# Patient Record
Sex: MALE | Race: WHITE
[De-identification: names, ages, dates, MRNs, and addresses within clinical notes are randomized per-mention and may not be internally consistent; named-entity substitution may affect disease eponyms.]

---

## 2023-05-31 ENCOUNTER — HOSPITAL ENCOUNTER (INPATIENT)
Dept: HOSPITAL 95 - ER | Age: 73
LOS: 8 days | Discharge: HOME HEALTH | DRG: 280 | End: 2023-06-08
Attending: HOSPITALIST | Admitting: HOSPITALIST
Payer: OTHER GOVERNMENT

## 2023-05-31 VITALS — SYSTOLIC BLOOD PRESSURE: 103 MMHG | DIASTOLIC BLOOD PRESSURE: 92 MMHG

## 2023-05-31 VITALS — HEIGHT: 68 IN | BODY MASS INDEX: 25.79 KG/M2 | WEIGHT: 170.2 LBS

## 2023-05-31 DIAGNOSIS — Z79.4: ICD-10-CM

## 2023-05-31 DIAGNOSIS — I47.29: ICD-10-CM

## 2023-05-31 DIAGNOSIS — N17.9: ICD-10-CM

## 2023-05-31 DIAGNOSIS — I08.1: ICD-10-CM

## 2023-05-31 DIAGNOSIS — E83.42: ICD-10-CM

## 2023-05-31 DIAGNOSIS — E88.09: ICD-10-CM

## 2023-05-31 DIAGNOSIS — Z99.81: ICD-10-CM

## 2023-05-31 DIAGNOSIS — N18.2: ICD-10-CM

## 2023-05-31 DIAGNOSIS — J90: ICD-10-CM

## 2023-05-31 DIAGNOSIS — Z79.82: ICD-10-CM

## 2023-05-31 DIAGNOSIS — K82.8: ICD-10-CM

## 2023-05-31 DIAGNOSIS — Z66: ICD-10-CM

## 2023-05-31 DIAGNOSIS — E11.22: ICD-10-CM

## 2023-05-31 DIAGNOSIS — K21.9: ICD-10-CM

## 2023-05-31 DIAGNOSIS — E78.5: ICD-10-CM

## 2023-05-31 DIAGNOSIS — Z20.822: ICD-10-CM

## 2023-05-31 DIAGNOSIS — D64.9: ICD-10-CM

## 2023-05-31 DIAGNOSIS — R57.0: ICD-10-CM

## 2023-05-31 DIAGNOSIS — R62.7: ICD-10-CM

## 2023-05-31 DIAGNOSIS — I25.2: ICD-10-CM

## 2023-05-31 DIAGNOSIS — Z95.5: ICD-10-CM

## 2023-05-31 DIAGNOSIS — Z79.01: ICD-10-CM

## 2023-05-31 DIAGNOSIS — R79.1: ICD-10-CM

## 2023-05-31 DIAGNOSIS — I11.0: Primary | ICD-10-CM

## 2023-05-31 DIAGNOSIS — F25.9: ICD-10-CM

## 2023-05-31 DIAGNOSIS — I21.A1: ICD-10-CM

## 2023-05-31 DIAGNOSIS — N50.89: ICD-10-CM

## 2023-05-31 DIAGNOSIS — I25.5: ICD-10-CM

## 2023-05-31 DIAGNOSIS — I25.10: ICD-10-CM

## 2023-05-31 DIAGNOSIS — G20: ICD-10-CM

## 2023-05-31 DIAGNOSIS — Z79.899: ICD-10-CM

## 2023-05-31 DIAGNOSIS — J96.01: ICD-10-CM

## 2023-05-31 DIAGNOSIS — E87.20: ICD-10-CM

## 2023-05-31 DIAGNOSIS — I50.82: ICD-10-CM

## 2023-05-31 DIAGNOSIS — D63.1: ICD-10-CM

## 2023-05-31 DIAGNOSIS — K80.20: ICD-10-CM

## 2023-05-31 DIAGNOSIS — I27.20: ICD-10-CM

## 2023-05-31 DIAGNOSIS — I50.21: ICD-10-CM

## 2023-05-31 DIAGNOSIS — E11.65: ICD-10-CM

## 2023-05-31 LAB
ALBUMIN SERPL BCP-MCNC: 2.7 G/DL (ref 3.4–5)
ALBUMIN/GLOB SERPL: 0.8 {RATIO} (ref 0.8–1.8)
ALT SERPL W P-5'-P-CCNC: 41 U/L (ref 12–78)
ANION GAP SERPL CALCULATED.4IONS-SCNC: 7 MMOL/L (ref 6–16)
AST SERPL W P-5'-P-CCNC: 36 U/L (ref 12–37)
BASOPHILS # BLD AUTO: 0.04 K/MM3 (ref 0–0.23)
BASOPHILS NFR BLD AUTO: 0 % (ref 0–2)
BILIRUB SERPL-MCNC: 0.3 MG/DL (ref 0.1–1)
BUN SERPL-MCNC: 50 MG/DL (ref 8–24)
CALCIUM SERPL-MCNC: 8.2 MG/DL (ref 8.5–10.1)
CHLORIDE SERPL-SCNC: 113 MMOL/L (ref 98–108)
CO2 SERPL-SCNC: 21 MMOL/L (ref 21–32)
CREAT SERPL-MCNC: 1.55 MG/DL (ref 0.6–1.2)
DEPRECATED RDW RBC AUTO: 53.3 FL (ref 35.1–46.3)
EOSINOPHIL # BLD AUTO: 0.15 K/MM3 (ref 0–0.68)
EOSINOPHIL NFR BLD AUTO: 2 % (ref 0–6)
ERYTHROCYTE [DISTWIDTH] IN BLOOD BY AUTOMATED COUNT: 16.5 % (ref 11.7–14.2)
FLUAV RNA SPEC QL NAA+PROBE: NEGATIVE
FLUBV RNA SPEC QL NAA+PROBE: NEGATIVE
GLOBULIN SER CALC-MCNC: 3.5 G/DL (ref 2.2–4)
GLUCOSE SERPL-MCNC: 97 MG/DL (ref 70–99)
HCT VFR BLD AUTO: 36.3 % (ref 37–53)
HGB BLD-MCNC: 11.5 G/DL (ref 13.5–17.5)
IMM GRANULOCYTES # BLD AUTO: 0.03 K/MM3 (ref 0–0.1)
IMM GRANULOCYTES NFR BLD AUTO: 0 % (ref 0–1)
LYMPHOCYTES # BLD AUTO: 0.56 K/MM3 (ref 0.84–5.2)
LYMPHOCYTES NFR BLD AUTO: 6 % (ref 21–46)
MCHC RBC AUTO-ENTMCNC: 31.7 G/DL (ref 31.5–36.5)
MCV RBC AUTO: 89 FL (ref 80–100)
MONOCYTES # BLD AUTO: 0.49 K/MM3 (ref 0.16–1.47)
MONOCYTES NFR BLD AUTO: 5 % (ref 4–13)
NEUTROPHILS # BLD AUTO: 7.92 K/MM3 (ref 1.96–9.15)
NEUTROPHILS NFR BLD AUTO: 86 % (ref 41–73)
NRBC # BLD AUTO: 0 K/MM3 (ref 0–0.02)
NRBC BLD AUTO-RTO: 0 /100 WBC (ref 0–0.2)
PLATELET # BLD AUTO: 227 K/MM3 (ref 150–400)
POTASSIUM SERPL-SCNC: 4.6 MMOL/L (ref 3.5–5.5)
PROT SERPL-MCNC: 6.2 G/DL (ref 6.4–8.2)
RSV RNA SPEC QL NAA+PROBE: NEGATIVE
SARS-COV-2 RNA RESP QL NAA+PROBE: NEGATIVE
SODIUM SERPL-SCNC: 141 MMOL/L (ref 136–145)

## 2023-05-31 PROCEDURE — A9270 NON-COVERED ITEM OR SERVICE: HCPCS

## 2023-05-31 PROCEDURE — C1769 GUIDE WIRE: HCPCS

## 2023-05-31 PROCEDURE — C1887 CATHETER, GUIDING: HCPCS

## 2023-05-31 PROCEDURE — C8929 TTE W OR WO FOL WCON,DOPPLER: HCPCS

## 2023-05-31 PROCEDURE — C1894 INTRO/SHEATH, NON-LASER: HCPCS

## 2023-05-31 NOTE — NUR
ARRIVAL:
 
I recieved report from Ellie JOHNSON RN. Patient arrived to PCU 01 via
gurney and was slid to the bed. He is alert and oriented. Oxygen via NC at 4l,
saturations are difficult to obtain. His breating is even and unlabored. VSS.
Patiet repositioned to his left side. Call light in reach.

## 2023-06-01 VITALS — SYSTOLIC BLOOD PRESSURE: 120 MMHG | DIASTOLIC BLOOD PRESSURE: 96 MMHG

## 2023-06-01 VITALS — SYSTOLIC BLOOD PRESSURE: 118 MMHG | DIASTOLIC BLOOD PRESSURE: 86 MMHG

## 2023-06-01 VITALS — DIASTOLIC BLOOD PRESSURE: 87 MMHG | SYSTOLIC BLOOD PRESSURE: 109 MMHG

## 2023-06-01 VITALS — DIASTOLIC BLOOD PRESSURE: 90 MMHG | SYSTOLIC BLOOD PRESSURE: 119 MMHG

## 2023-06-01 VITALS — DIASTOLIC BLOOD PRESSURE: 87 MMHG | SYSTOLIC BLOOD PRESSURE: 113 MMHG

## 2023-06-01 VITALS — SYSTOLIC BLOOD PRESSURE: 118 MMHG | DIASTOLIC BLOOD PRESSURE: 96 MMHG

## 2023-06-01 VITALS — SYSTOLIC BLOOD PRESSURE: 108 MMHG | DIASTOLIC BLOOD PRESSURE: 85 MMHG

## 2023-06-01 LAB
ANION GAP SERPL CALCULATED.4IONS-SCNC: 8 MMOL/L (ref 6–16)
BASOPHILS # BLD AUTO: 0.03 K/MM3 (ref 0–0.23)
BASOPHILS NFR BLD AUTO: 0 % (ref 0–2)
BUN SERPL-MCNC: 50 MG/DL (ref 8–24)
CALCIUM SERPL-MCNC: 8.2 MG/DL (ref 8.5–10.1)
CHLORIDE SERPL-SCNC: 111 MMOL/L (ref 98–108)
CO2 SERPL-SCNC: 20 MMOL/L (ref 21–32)
CREAT SERPL-MCNC: 1.59 MG/DL (ref 0.6–1.2)
DEPRECATED RDW RBC AUTO: 50.6 FL (ref 35.1–46.3)
EOSINOPHIL # BLD AUTO: 0.13 K/MM3 (ref 0–0.68)
EOSINOPHIL NFR BLD AUTO: 1 % (ref 0–6)
ERYTHROCYTE [DISTWIDTH] IN BLOOD BY AUTOMATED COUNT: 16.4 % (ref 11.7–14.2)
GLUCOSE SERPL-MCNC: 218 MG/DL (ref 70–99)
HCT VFR BLD AUTO: 35.3 % (ref 37–53)
HGB BLD-MCNC: 11.2 G/DL (ref 13.5–17.5)
IMM GRANULOCYTES # BLD AUTO: 0.02 K/MM3 (ref 0–0.1)
IMM GRANULOCYTES NFR BLD AUTO: 0 % (ref 0–1)
LYMPHOCYTES # BLD AUTO: 0.77 K/MM3 (ref 0.84–5.2)
LYMPHOCYTES NFR BLD AUTO: 8 % (ref 21–46)
MCHC RBC AUTO-ENTMCNC: 31.7 G/DL (ref 31.5–36.5)
MCV RBC AUTO: 86 FL (ref 80–100)
MONOCYTES # BLD AUTO: 0.46 K/MM3 (ref 0.16–1.47)
MONOCYTES NFR BLD AUTO: 5 % (ref 4–13)
NEUTROPHILS # BLD AUTO: 7.88 K/MM3 (ref 1.96–9.15)
NEUTROPHILS NFR BLD AUTO: 85 % (ref 41–73)
NRBC # BLD AUTO: 0 K/MM3 (ref 0–0.02)
NRBC BLD AUTO-RTO: 0 /100 WBC (ref 0–0.2)
PLATELET # BLD AUTO: 257 K/MM3 (ref 150–400)
POTASSIUM SERPL-SCNC: 4.6 MMOL/L (ref 3.5–5.5)
SODIUM SERPL-SCNC: 139 MMOL/L (ref 136–145)

## 2023-06-01 NOTE — NUR
SHIFT SUMMARY
ASSUMED CARE OF PT AT 1900. PT IS A/OX4 BUT ODD. FOR EXAMPLE, PT REPEATS
HIMSELF, SUCH AS STATING FAVORITE FOOD IS LIVER AND ONIONS AND PT WILL BRING
UP IRRELIVENT SUBJECTS SUCH AS HIS BIRTHDAY BEING IN 4 MONTHS. HEART SOUNDS
TACHY. PT RATE WAS BETWEEN  WHILE SLEEPING -130 WHILE AWAKE.
HOSPITALIST NOTIFIED AND ORDERED HOME MEDIATIONS. PT AS PITTING EDEMA IN LEGS.
 
AT 0330 PT C/O CHEST PAIN LIKE WHEN HE HAD A HEART ATTACK. EKG DONE,
HOSPITALIST ORDERED LABS. THIS NURSE WENT TO REASSESS AND ASKED OF PAIN WAS
BETTER AND PT SAID "YES, AFTER THE TUMS".
 
PT USED A CONDOM CATH DURING THE NOC. PT USED BEDPAN. HAS SOME RED AREA IN
GROIN AND SCRAPES ON FEET.

## 2023-06-01 NOTE — NUR
SHIFT SUMMARY/REPORT GIVEN
PT ALERT AND ORIENTED X 4. FORGETFUL AT TIMES. REPEATS QUESTIONS. HR STABLE.
BP STABLE. NO CP OR PRESSURE REPORTED T/O SHIFT. DR. THOMAS CONSULTED. PLAN TO
CONT DIURESING PT AND POSSIBLE ANGIO LATER ON. OXYGEN SATURATION MAINTAINED
ABOVE 92% ON 2 L VIA NC. PT 2 PERSON ASSIST UP TO COMMODE. DOES NOT USE CALL
LIGHT, INSTRUCTED HOW TO USE MULTIPLE TIMES. BED ALARM IN PLACE. CALL LIGHT
WITHIN REACH. REPORT GIVEN TO CLAUDINE ARROYO RN.

## 2023-06-02 VITALS — DIASTOLIC BLOOD PRESSURE: 77 MMHG | SYSTOLIC BLOOD PRESSURE: 108 MMHG

## 2023-06-02 VITALS — DIASTOLIC BLOOD PRESSURE: 92 MMHG | SYSTOLIC BLOOD PRESSURE: 109 MMHG

## 2023-06-02 VITALS — SYSTOLIC BLOOD PRESSURE: 122 MMHG | DIASTOLIC BLOOD PRESSURE: 98 MMHG

## 2023-06-02 VITALS — SYSTOLIC BLOOD PRESSURE: 111 MMHG | DIASTOLIC BLOOD PRESSURE: 98 MMHG

## 2023-06-02 VITALS — DIASTOLIC BLOOD PRESSURE: 87 MMHG | SYSTOLIC BLOOD PRESSURE: 117 MMHG

## 2023-06-02 VITALS — SYSTOLIC BLOOD PRESSURE: 102 MMHG | DIASTOLIC BLOOD PRESSURE: 86 MMHG

## 2023-06-02 LAB
ALBUMIN SERPL BCP-MCNC: 2.7 G/DL (ref 3.4–5)
ANION GAP SERPL CALCULATED.4IONS-SCNC: 9 MMOL/L (ref 6–16)
BASOPHILS # BLD AUTO: 0.05 K/MM3 (ref 0–0.23)
BASOPHILS NFR BLD AUTO: 1 % (ref 0–2)
BUN SERPL-MCNC: 56 MG/DL (ref 8–24)
CALCIUM SERPL-MCNC: 8.6 MG/DL (ref 8.5–10.1)
CHLORIDE SERPL-SCNC: 109 MMOL/L (ref 98–108)
CO2 SERPL-SCNC: 21 MMOL/L (ref 21–32)
CREAT SERPL-MCNC: 1.68 MG/DL (ref 0.6–1.2)
DEPRECATED RDW RBC AUTO: 50.4 FL (ref 35.1–46.3)
EOSINOPHIL # BLD AUTO: 0.15 K/MM3 (ref 0–0.68)
EOSINOPHIL NFR BLD AUTO: 2 % (ref 0–6)
ERYTHROCYTE [DISTWIDTH] IN BLOOD BY AUTOMATED COUNT: 16.4 % (ref 11.7–14.2)
GLUCOSE SERPL-MCNC: 187 MG/DL (ref 70–99)
HCT VFR BLD AUTO: 34.6 % (ref 37–53)
HGB BLD-MCNC: 11.2 G/DL (ref 13.5–17.5)
IMM GRANULOCYTES # BLD AUTO: 0.02 K/MM3 (ref 0–0.1)
IMM GRANULOCYTES NFR BLD AUTO: 0 % (ref 0–1)
LYMPHOCYTES # BLD AUTO: 0.93 K/MM3 (ref 0.84–5.2)
LYMPHOCYTES NFR BLD AUTO: 11 % (ref 21–46)
MCHC RBC AUTO-ENTMCNC: 32.4 G/DL (ref 31.5–36.5)
MCV RBC AUTO: 85 FL (ref 80–100)
MONOCYTES # BLD AUTO: 0.54 K/MM3 (ref 0.16–1.47)
MONOCYTES NFR BLD AUTO: 7 % (ref 4–13)
NEUTROPHILS # BLD AUTO: 6.66 K/MM3 (ref 1.96–9.15)
NEUTROPHILS NFR BLD AUTO: 80 % (ref 41–73)
NRBC # BLD AUTO: 0 K/MM3 (ref 0–0.02)
NRBC BLD AUTO-RTO: 0 /100 WBC (ref 0–0.2)
PHOSPHATE SERPL-MCNC: 3.8 MG/DL (ref 2.5–4.9)
PLATELET # BLD AUTO: 240 K/MM3 (ref 150–400)
POTASSIUM SERPL-SCNC: 4.4 MMOL/L (ref 3.5–5.5)
SODIUM SERPL-SCNC: 139 MMOL/L (ref 136–145)

## 2023-06-02 NOTE — NUR
SHIFT SUMMARY
PT ALERT AND ORIENTED X 4. FORGETFUL AT TIMES. PT REPEATS SELF FREQUENTY.
CARDIOLOGIST AND MD NOTIFIED OF PT'S INCREASE IN BNP. PT 2 PERSON ASSIST TO
CHAIR FOR MEALS AND COMMODE. PT DOES NOT USE CALL LIGHT. FREQUENT CHECKS TO
SEE IF PT IS IN NEED OF ASSISTANCE. BP STABLE. HR STABLE. SR-'S. MD
AWARE OF 11 BEAT RUN OF VTACH. MAG WNL. OXYGEN SATURATION MAINTAINED ABOVE 92%
ON 2 L VIA NC. CAREGIVER AT BEDSIDE THIS EVENING. PT Q 2 TURN. COCCYX RED.
MEPLEX IN PLACE. NO REPORTS OF CP OR PRESSURE. BED IN LOWEST POSITION. BED
ALARM IN PLACE. WILL CONT TO MONITOR UNTIL REPORT GIVEN TO NIGHTSHIFT RN.

## 2023-06-02 NOTE — NUR
SHIFT SUMMARY
ASSUMED CARE OF PT AT 1900. PT IS A/OX4. HEART SOUNDS TACHY. LUNG SOUNDS
DIMINISHED AT BASES. PT REMAINED ON 2L NC, SATURATIONS ABOVE 95%. PT ONLY
SLEPT ABOUT 10 MIN AT A TIME. PT WAS A 2P ASSIST TO BSC. PT HAD SMALL BM. PT
USED CONDOM CATH DURING THE NOC. PT MADE NPO AT MIDNIGHT FOR POSSIBLE ANGIO
PER CARDIOLOGY NOTE. PT STARTED ON FLUID RESTRICTION ALSO. PT HAD NO NEW
COMPLAINTS.

## 2023-06-03 VITALS — DIASTOLIC BLOOD PRESSURE: 93 MMHG | SYSTOLIC BLOOD PRESSURE: 117 MMHG

## 2023-06-03 VITALS — DIASTOLIC BLOOD PRESSURE: 76 MMHG | SYSTOLIC BLOOD PRESSURE: 96 MMHG

## 2023-06-03 VITALS — DIASTOLIC BLOOD PRESSURE: 94 MMHG | SYSTOLIC BLOOD PRESSURE: 123 MMHG

## 2023-06-03 VITALS — DIASTOLIC BLOOD PRESSURE: 93 MMHG | SYSTOLIC BLOOD PRESSURE: 115 MMHG

## 2023-06-03 VITALS — SYSTOLIC BLOOD PRESSURE: 105 MMHG | DIASTOLIC BLOOD PRESSURE: 83 MMHG

## 2023-06-03 VITALS — SYSTOLIC BLOOD PRESSURE: 107 MMHG | DIASTOLIC BLOOD PRESSURE: 84 MMHG

## 2023-06-03 LAB
ALBUMIN SERPL BCP-MCNC: 2.7 G/DL (ref 3.4–5)
ANION GAP SERPL CALCULATED.4IONS-SCNC: 7 MMOL/L (ref 6–16)
BASOPHILS # BLD AUTO: 0.04 K/MM3 (ref 0–0.23)
BASOPHILS NFR BLD AUTO: 0 % (ref 0–2)
BUN SERPL-MCNC: 50 MG/DL (ref 8–24)
CALCIUM SERPL-MCNC: 8.5 MG/DL (ref 8.5–10.1)
CHLORIDE SERPL-SCNC: 106 MMOL/L (ref 98–108)
CO2 SERPL-SCNC: 26 MMOL/L (ref 21–32)
CREAT SERPL-MCNC: 1.44 MG/DL (ref 0.6–1.2)
DEPRECATED RDW RBC AUTO: 48.7 FL (ref 35.1–46.3)
EOSINOPHIL # BLD AUTO: 0.12 K/MM3 (ref 0–0.68)
EOSINOPHIL NFR BLD AUTO: 1 % (ref 0–6)
ERYTHROCYTE [DISTWIDTH] IN BLOOD BY AUTOMATED COUNT: 16.3 % (ref 11.7–14.2)
GLUCOSE SERPL-MCNC: 191 MG/DL (ref 70–99)
HCT VFR BLD AUTO: 33.7 % (ref 37–53)
HGB BLD-MCNC: 11.2 G/DL (ref 13.5–17.5)
IMM GRANULOCYTES # BLD AUTO: 0.03 K/MM3 (ref 0–0.1)
IMM GRANULOCYTES NFR BLD AUTO: 0 % (ref 0–1)
LYMPHOCYTES # BLD AUTO: 0.57 K/MM3 (ref 0.84–5.2)
LYMPHOCYTES NFR BLD AUTO: 6 % (ref 21–46)
MCHC RBC AUTO-ENTMCNC: 33.2 G/DL (ref 31.5–36.5)
MCV RBC AUTO: 83 FL (ref 80–100)
MONOCYTES # BLD AUTO: 0.47 K/MM3 (ref 0.16–1.47)
MONOCYTES NFR BLD AUTO: 5 % (ref 4–13)
NEUTROPHILS # BLD AUTO: 9.2 K/MM3 (ref 1.96–9.15)
NEUTROPHILS NFR BLD AUTO: 88 % (ref 41–73)
NRBC # BLD AUTO: 0 K/MM3 (ref 0–0.02)
NRBC BLD AUTO-RTO: 0 /100 WBC (ref 0–0.2)
PHOSPHATE SERPL-MCNC: 3.4 MG/DL (ref 2.5–4.9)
PLATELET # BLD AUTO: 244 K/MM3 (ref 150–400)
POTASSIUM SERPL-SCNC: 4.2 MMOL/L (ref 3.5–5.5)
SODIUM SERPL-SCNC: 139 MMOL/L (ref 136–145)

## 2023-06-03 NOTE — NUR
SHIFT SUMMARY
ASSUMED CARE OF PT AT 1900. PT IS A/OX4. PT HR RANGED FROM . LUNG SOUNDS
DIMINISHED. PT WAS RA FOR PART OF THE SHIFT BUT DURING THE NOC NEEDED 2L NC TO
MAINTAIN SATS. PT HAD CONDOM CATH FOR STRICT I/O. PT REMAINED IN BED BUT DID
NOT SLEEP.

## 2023-06-03 NOTE — NUR
AM NOTE;
 
PT ALERT AND ORIENTED X3, FLAT AFFECT HX SCHIZO, ABLE TO COMMUNICATE NEEDS
COOPERATIVE WITH CARES. 1PA FOR TRANSFERS. PT WAS ASSISTED TO CHAIR THIS
MORNING FOR BREAKFAST WITH NO ISSUES RECEIVED A BEDBATH AS WELL. DENIES CHEST
PAIN/PRESSURE. VITALS HRR 'S, 'S, SATS KEPT ABOVE 92% ON RA,
AFEBRILE. CONDOM CATH WAS IN PLACE AT SHIFT CHANGE FOR ACCURATE I&O'S PT HAD A
HARD TIME KEEPING CONDO CATH IN PLACE, ATTENDS WITH PEE PAD ON. PT ABLE TO USE
BSC FOR TOILETING. REMAINS ON 2L FR PT COMPLIANT. NO OTHER ISSUES ENCOUNTERED
THIS MORNING PT NOW BACK IN BED, CALL LIGHTS IN REACH WILL CONTINUE TO MONITOR

## 2023-06-04 VITALS — DIASTOLIC BLOOD PRESSURE: 100 MMHG | SYSTOLIC BLOOD PRESSURE: 120 MMHG

## 2023-06-04 VITALS — SYSTOLIC BLOOD PRESSURE: 120 MMHG | DIASTOLIC BLOOD PRESSURE: 90 MMHG

## 2023-06-04 VITALS — DIASTOLIC BLOOD PRESSURE: 74 MMHG | SYSTOLIC BLOOD PRESSURE: 93 MMHG

## 2023-06-04 VITALS — SYSTOLIC BLOOD PRESSURE: 97 MMHG | DIASTOLIC BLOOD PRESSURE: 72 MMHG

## 2023-06-04 VITALS — DIASTOLIC BLOOD PRESSURE: 87 MMHG | SYSTOLIC BLOOD PRESSURE: 106 MMHG

## 2023-06-04 VITALS — DIASTOLIC BLOOD PRESSURE: 89 MMHG | SYSTOLIC BLOOD PRESSURE: 110 MMHG

## 2023-06-04 VITALS — SYSTOLIC BLOOD PRESSURE: 111 MMHG | DIASTOLIC BLOOD PRESSURE: 97 MMHG

## 2023-06-04 VITALS — SYSTOLIC BLOOD PRESSURE: 112 MMHG | DIASTOLIC BLOOD PRESSURE: 75 MMHG

## 2023-06-04 VITALS — SYSTOLIC BLOOD PRESSURE: 108 MMHG | DIASTOLIC BLOOD PRESSURE: 84 MMHG

## 2023-06-04 LAB
ALBUMIN SERPL BCP-MCNC: 2.7 G/DL (ref 3.4–5)
ANION GAP SERPL CALCULATED.4IONS-SCNC: 9 MMOL/L (ref 6–16)
BASOPHILS # BLD AUTO: 0.04 K/MM3 (ref 0–0.23)
BASOPHILS NFR BLD AUTO: 0 % (ref 0–2)
BUN SERPL-MCNC: 42 MG/DL (ref 8–24)
CALCIUM SERPL-MCNC: 8.7 MG/DL (ref 8.5–10.1)
CHLORIDE SERPL-SCNC: 101 MMOL/L (ref 98–108)
CO2 SERPL-SCNC: 28 MMOL/L (ref 21–32)
CREAT SERPL-MCNC: 1.34 MG/DL (ref 0.6–1.2)
DEPRECATED RDW RBC AUTO: 49.4 FL (ref 35.1–46.3)
EOSINOPHIL # BLD AUTO: 0.18 K/MM3 (ref 0–0.68)
EOSINOPHIL NFR BLD AUTO: 2 % (ref 0–6)
ERYTHROCYTE [DISTWIDTH] IN BLOOD BY AUTOMATED COUNT: 16.4 % (ref 11.7–14.2)
GLUCOSE SERPL-MCNC: 97 MG/DL (ref 70–99)
HCT VFR BLD AUTO: 34.1 % (ref 37–53)
HGB BLD-MCNC: 11.2 G/DL (ref 13.5–17.5)
IMM GRANULOCYTES # BLD AUTO: 0.03 K/MM3 (ref 0–0.1)
IMM GRANULOCYTES NFR BLD AUTO: 0 % (ref 0–1)
LYMPHOCYTES # BLD AUTO: 0.73 K/MM3 (ref 0.84–5.2)
LYMPHOCYTES NFR BLD AUTO: 8 % (ref 21–46)
MCHC RBC AUTO-ENTMCNC: 32.8 G/DL (ref 31.5–36.5)
MCV RBC AUTO: 84 FL (ref 80–100)
MONOCYTES # BLD AUTO: 0.4 K/MM3 (ref 0.16–1.47)
MONOCYTES NFR BLD AUTO: 4 % (ref 4–13)
NEUTROPHILS # BLD AUTO: 7.77 K/MM3 (ref 1.96–9.15)
NEUTROPHILS NFR BLD AUTO: 85 % (ref 41–73)
NRBC # BLD AUTO: 0 K/MM3 (ref 0–0.02)
NRBC BLD AUTO-RTO: 0 /100 WBC (ref 0–0.2)
PHOSPHATE SERPL-MCNC: 3.1 MG/DL (ref 2.5–4.9)
PLATELET # BLD AUTO: 240 K/MM3 (ref 150–400)
POTASSIUM SERPL-SCNC: 3.7 MMOL/L (ref 3.5–5.5)
SODIUM SERPL-SCNC: 138 MMOL/L (ref 136–145)

## 2023-06-04 NOTE — NUR
SHIFT SUMMARY:
 
PT HAD NO ACUTE EVENTS THROUGHOUT THE SHIFT. PT VITALS REMAINED STABLE WITH
SBP IN 'S. HR IN THE 90'S-110'S. PT DENIES ANY CHEST PAIN OR PRESSURE.
PT ABLE TO AMBULATE TO THE COMMODE WITH A 1 PERSON ASSIST.PT HAD A BED BATH
THIS AFTERNOON. PT CONTINUES TO REMAIN ALERT AND ORIENTED X4. PT IMPULSIVE AT
TIMES, BED ALARM TURNED ON WITH FREQUENT REMINDERS TO PUSH THE CALL LIGHT FOR
ASSISTANCE. PT CONTINUES TO REMAIN ON A 1000ML FLUID RESTRICTION, PT COMPLIANT
WITH THIS ORDER. PT HAS A CONDOM CATH IN PLACE WITH 1500 MLS URINE OUTPUT.
WHICH IS DRAINING TO GRAVITY, YELLOW URINE. PT CAREGIVER AT THE BEDSIDE THIS
AFTERNOON AND UPDATED TO PLAN OF CARE. POSSIBLE PLANS FOR AN ANGIOGRAM
TOMORROW IF PT REMAINS STABLE T/O THE NIGHT. PT NPO AT MIDNIGHT FOR POSSIBLE
PROCEDURE. WILL CONTINUE TO MONITOR AND GIVE REPORT TO ONCOMING NOC SHIFT RN.

## 2023-06-04 NOTE — NUR
SHIFT SUMMARY
ASSUMED CARE OF PT AT 1900. PT IS A/OX4 BUT FORGETFUL. PT ATTEMPTED TO GET OUT
OF BED ONCE THIS SHIFT. HEART SOUNDS TACHY. LUNG SOUNDS DIMINSHED AT BASES. PT
WA RA T/O THE NOC. CONDOM CATH. PT HAD NO NEW COMPLAINTS BUT DID NOT SLEEP AT
ALL TONIGHT.

## 2023-06-05 VITALS — SYSTOLIC BLOOD PRESSURE: 104 MMHG | DIASTOLIC BLOOD PRESSURE: 82 MMHG

## 2023-06-05 VITALS — SYSTOLIC BLOOD PRESSURE: 95 MMHG | DIASTOLIC BLOOD PRESSURE: 80 MMHG

## 2023-06-05 VITALS — DIASTOLIC BLOOD PRESSURE: 82 MMHG | SYSTOLIC BLOOD PRESSURE: 103 MMHG

## 2023-06-05 VITALS — SYSTOLIC BLOOD PRESSURE: 107 MMHG | DIASTOLIC BLOOD PRESSURE: 80 MMHG

## 2023-06-05 VITALS — SYSTOLIC BLOOD PRESSURE: 90 MMHG | DIASTOLIC BLOOD PRESSURE: 71 MMHG

## 2023-06-05 VITALS — DIASTOLIC BLOOD PRESSURE: 80 MMHG | SYSTOLIC BLOOD PRESSURE: 108 MMHG

## 2023-06-05 VITALS — SYSTOLIC BLOOD PRESSURE: 94 MMHG | DIASTOLIC BLOOD PRESSURE: 79 MMHG

## 2023-06-05 VITALS — SYSTOLIC BLOOD PRESSURE: 106 MMHG | DIASTOLIC BLOOD PRESSURE: 83 MMHG

## 2023-06-05 VITALS — SYSTOLIC BLOOD PRESSURE: 114 MMHG | DIASTOLIC BLOOD PRESSURE: 88 MMHG

## 2023-06-05 VITALS — DIASTOLIC BLOOD PRESSURE: 76 MMHG | SYSTOLIC BLOOD PRESSURE: 93 MMHG

## 2023-06-05 VITALS — SYSTOLIC BLOOD PRESSURE: 96 MMHG | DIASTOLIC BLOOD PRESSURE: 77 MMHG

## 2023-06-05 VITALS — DIASTOLIC BLOOD PRESSURE: 82 MMHG | SYSTOLIC BLOOD PRESSURE: 95 MMHG

## 2023-06-05 VITALS — SYSTOLIC BLOOD PRESSURE: 103 MMHG | DIASTOLIC BLOOD PRESSURE: 86 MMHG

## 2023-06-05 VITALS — DIASTOLIC BLOOD PRESSURE: 85 MMHG | SYSTOLIC BLOOD PRESSURE: 115 MMHG

## 2023-06-05 LAB
ANION GAP SERPL CALCULATED.4IONS-SCNC: 6 MMOL/L (ref 6–16)
BASOPHILS # BLD AUTO: 0.03 K/MM3 (ref 0–0.23)
BASOPHILS NFR BLD AUTO: 0 % (ref 0–2)
BUN SERPL-MCNC: 37 MG/DL (ref 8–24)
CALCIUM SERPL-MCNC: 8.4 MG/DL (ref 8.5–10.1)
CHLORIDE SERPL-SCNC: 97 MMOL/L (ref 98–108)
CO2 SERPL-SCNC: 32 MMOL/L (ref 21–32)
CREAT SERPL-MCNC: 1.29 MG/DL (ref 0.6–1.2)
DEPRECATED RDW RBC AUTO: 49.4 FL (ref 35.1–46.3)
EOSINOPHIL # BLD AUTO: 0.24 K/MM3 (ref 0–0.68)
EOSINOPHIL NFR BLD AUTO: 4 % (ref 0–6)
ERYTHROCYTE [DISTWIDTH] IN BLOOD BY AUTOMATED COUNT: 16.7 % (ref 11.7–14.2)
GLUCOSE SERPL-MCNC: 145 MG/DL (ref 70–99)
HCT VFR BLD AUTO: 33.4 % (ref 37–53)
HGB BLD-MCNC: 11.2 G/DL (ref 13.5–17.5)
IMM GRANULOCYTES # BLD AUTO: 0.02 K/MM3 (ref 0–0.1)
IMM GRANULOCYTES NFR BLD AUTO: 0 % (ref 0–1)
LYMPHOCYTES # BLD AUTO: 0.79 K/MM3 (ref 0.84–5.2)
LYMPHOCYTES NFR BLD AUTO: 11 % (ref 21–46)
MAGNESIUM SERPL-MCNC: 1.5 MG/DL (ref 1.6–2.4)
MCHC RBC AUTO-ENTMCNC: 33.5 G/DL (ref 31.5–36.5)
MCV RBC AUTO: 83 FL (ref 80–100)
MONOCYTES # BLD AUTO: 0.36 K/MM3 (ref 0.16–1.47)
MONOCYTES NFR BLD AUTO: 5 % (ref 4–13)
NEUTROPHILS # BLD AUTO: 5.47 K/MM3 (ref 1.96–9.15)
NEUTROPHILS NFR BLD AUTO: 79 % (ref 41–73)
NRBC # BLD AUTO: 0 K/MM3 (ref 0–0.02)
NRBC BLD AUTO-RTO: 0 /100 WBC (ref 0–0.2)
PLATELET # BLD AUTO: 228 K/MM3 (ref 150–400)
POTASSIUM SERPL-SCNC: 3.9 MMOL/L (ref 3.5–5.5)
SODIUM SERPL-SCNC: 135 MMOL/L (ref 136–145)

## 2023-06-05 PROCEDURE — B2161ZZ FLUOROSCOPY OF RIGHT AND LEFT HEART USING LOW OSMOLAR CONTRAST: ICD-10-PCS | Performed by: INTERNAL MEDICINE

## 2023-06-05 PROCEDURE — B246ZZ3 ULTRASONOGRAPHY OF RIGHT AND LEFT HEART, INTRAVASCULAR: ICD-10-PCS | Performed by: INTERNAL MEDICINE

## 2023-06-05 PROCEDURE — 4A023N8 MEASUREMENT OF CARDIAC SAMPLING AND PRESSURE, BILATERAL, PERCUTANEOUS APPROACH: ICD-10-PCS | Performed by: INTERNAL MEDICINE

## 2023-06-05 NOTE — NUR
SHIFT SUMMARY
 
PT IS A/Ox4 AND COOPERATIVE WITH CARE PROVIDED BY MEMBERS OF STAFF. ANSWERS
QUESTIONS APPROPRIATELY AND ABLE TO MAKE HIS NEEDS KNOWN. HX OF
SCHIZOAFFECTIVE DISORDER WITH PT BECOMING IMPULSIVE AT TIMES. CARDIAC SOOD,
REMAINS IN SR-JUNCTIONAL TACH WITH HR RANGING IN THE 70-80'S AND THEN
INTERMITTENTLY JUMPING 'S. PT ALSO HAS SMALL 16 BEAT RUN OF VTACH. PT
WAS ASYMPTOMATIC OF INCIDENT WITH NO REPORTS OF PALPITAIONS OR DIZZINESS.
NO REPORTS OF CP OR PRESSURE T/O THE NIGHT.  SBP HAS BEEN SOFT WITH SP IN THE
90'S, BUT HAS BEEN STABLE. RESPIRATORY WISE, MAINTAINS SPO2 >90% ON RA.
INTERMITTENT EPISODES OF SOB WHEN LAYING FLAT OR WITH ABULATION. PT DOES
RECOVER QUICKY HOWEVER. PER DAYSHIFT REPORT, PT DOSES OF LASIX HAVE BEEN
INCREASED. GI/, PT IS INCONTINENT OF URINE, CONDOM CATH IN PLACE, PATENT AND
DRAINING CLEAR/YELLOW URINE TO GRAVITY. ABLE TO ABULATE TO BSC WITH TWO PERSON
ASSIST.  PT HAS BEEN NPO SINCE MDN FOR SCHEDULED ANGIO THIS AM. NO NEW ORDERS
AT THIS TIME, WILL REPORT TO ONCOMING RN. JHOANA DU AS OF THIS NOTE

## 2023-06-05 NOTE — NUR
SHIFT SUMMARY:
 
PT REMAINED ALERT AND ORIENTED X4 THROUGHOUT THE SHIFT. ABLE TO ANSWER
QUESTIONS AND MAKE NEEDS KNOWN. PT COOPERATIVE AND PLEASANT WITH STAFF. PT
IMPULSIVE AT TIMES AND REMINDED TO USE THE CALL LIGHT FOR ASSISTANCE. PT BED
ALARM ON FOR SAFETY. PT VITALS REMAINED STABLE WITH 'S HR 70'S-120'S.
PT DENIED ANY CHEST PAIN OR PRESSURE T/O THE SHIFT. PT WENT FOR ANGIOGRAM
TODAY AND RETURNED WITH A TR BAND IN THE RIGHT WRIST, AS WELL AS AN ACCESS
SITE IN THE RIGHT UPPER ARM. TR BAND HAD 10 MLS OF AIR WHICH WAS DEFLATED AND
REMOVED WITH NO BLEEDING, OOZING OR HEMATOMA PRESENT. PT HAS OBSITE IN PLACE
AND ARMBOARD AS A REMINDER TO NOT USE WRIST. PT UPPER SITE HAS A DRESSING
WHICH HAS NO BLEEDING, OOZING OR HEMATOMA PRESENT. PER DR. THOMAS PT NEEDS A
SURGICAL CONSULT FOR FURTHER INTERVENTION. CAREGIVER WAS AT THE BEDSIDE THIS
EVENING AND WAS UPDATED TO PLAN OF CARE. PT TRANSITIONED TO PO LASIX WITH THE
FIRST DOSE GIVEN THIS EVENING.PT HAS A CONDOM CATH IN PLACE, WHICH HAS HAD A
TOTAL OF 2750 ML T/O THE SHIFT OF PALE YELLOW URINE, WHICH IS DRAINING TO
GRAVITY. WILL CONTINUE TO MONITOR AND GIVE REPORT TO THE ONCOMING [facility] SHIFT
RN.

## 2023-06-05 NOTE — NUR
AM NOTE:
PT ALERT AND ORIENTED X4. ABLE TO ANSWER QUESTIONS AND MAKE NEEDS KNOWN. PT
 WITH 'S. PT DENIES ANY CHEST PAIN/PRESSURE OR SOB. PT HAS
BILATERAL EDEMA IN LOWER EXTREMETIES, SCROTUM AND MID ABDOMEN. PT DENIES ANY
PAIN T/O BODY. DR. THOMAS AT BEDSIDE THIS MORNING WITH PLANS FOR AN ANGIOGRAM
THIS AFTERNOON. HELD THE LOVENOX PER DR. THOMAS THIS MORNING FOR PROCEDURE. PT
MADE NPO AT MIDNIGHT. WILL CONTINUE TO MONITOR T/O THE SHIFT.

## 2023-06-06 VITALS — DIASTOLIC BLOOD PRESSURE: 78 MMHG | SYSTOLIC BLOOD PRESSURE: 95 MMHG

## 2023-06-06 VITALS — DIASTOLIC BLOOD PRESSURE: 74 MMHG | SYSTOLIC BLOOD PRESSURE: 94 MMHG

## 2023-06-06 VITALS — SYSTOLIC BLOOD PRESSURE: 79 MMHG | DIASTOLIC BLOOD PRESSURE: 62 MMHG

## 2023-06-06 VITALS — SYSTOLIC BLOOD PRESSURE: 97 MMHG | DIASTOLIC BLOOD PRESSURE: 81 MMHG

## 2023-06-06 VITALS — SYSTOLIC BLOOD PRESSURE: 87 MMHG | DIASTOLIC BLOOD PRESSURE: 68 MMHG

## 2023-06-06 VITALS — DIASTOLIC BLOOD PRESSURE: 70 MMHG | SYSTOLIC BLOOD PRESSURE: 91 MMHG

## 2023-06-06 VITALS — DIASTOLIC BLOOD PRESSURE: 72 MMHG | SYSTOLIC BLOOD PRESSURE: 97 MMHG

## 2023-06-06 VITALS — DIASTOLIC BLOOD PRESSURE: 73 MMHG | SYSTOLIC BLOOD PRESSURE: 98 MMHG

## 2023-06-06 VITALS — SYSTOLIC BLOOD PRESSURE: 94 MMHG | DIASTOLIC BLOOD PRESSURE: 68 MMHG

## 2023-06-06 VITALS — DIASTOLIC BLOOD PRESSURE: 74 MMHG | SYSTOLIC BLOOD PRESSURE: 95 MMHG

## 2023-06-06 LAB
ANION GAP SERPL CALCULATED.4IONS-SCNC: 8 MMOL/L (ref 6–16)
BASOPHILS # BLD AUTO: 0.03 K/MM3 (ref 0–0.23)
BASOPHILS NFR BLD AUTO: 1 % (ref 0–2)
BUN SERPL-MCNC: 33 MG/DL (ref 8–24)
CALCIUM SERPL-MCNC: 8.7 MG/DL (ref 8.5–10.1)
CHLORIDE SERPL-SCNC: 91 MMOL/L (ref 98–108)
CO2 SERPL-SCNC: 36 MMOL/L (ref 21–32)
CREAT SERPL-MCNC: 1.33 MG/DL (ref 0.6–1.2)
DEPRECATED RDW RBC AUTO: 49.1 FL (ref 35.1–46.3)
EOSINOPHIL # BLD AUTO: 0.26 K/MM3 (ref 0–0.68)
EOSINOPHIL NFR BLD AUTO: 4 % (ref 0–6)
ERYTHROCYTE [DISTWIDTH] IN BLOOD BY AUTOMATED COUNT: 16.8 % (ref 11.7–14.2)
GLUCOSE SERPL-MCNC: 110 MG/DL (ref 70–99)
HCT VFR BLD AUTO: 33.5 % (ref 37–53)
HGB BLD-MCNC: 11.3 G/DL (ref 13.5–17.5)
IMM GRANULOCYTES # BLD AUTO: 0.02 K/MM3 (ref 0–0.1)
IMM GRANULOCYTES NFR BLD AUTO: 0 % (ref 0–1)
LYMPHOCYTES # BLD AUTO: 0.59 K/MM3 (ref 0.84–5.2)
LYMPHOCYTES NFR BLD AUTO: 9 % (ref 21–46)
MAGNESIUM SERPL-MCNC: 1.9 MG/DL (ref 1.6–2.4)
MCHC RBC AUTO-ENTMCNC: 33.7 G/DL (ref 31.5–36.5)
MCV RBC AUTO: 82 FL (ref 80–100)
MONOCYTES # BLD AUTO: 0.38 K/MM3 (ref 0.16–1.47)
MONOCYTES NFR BLD AUTO: 6 % (ref 4–13)
NEUTROPHILS # BLD AUTO: 4.97 K/MM3 (ref 1.96–9.15)
NEUTROPHILS NFR BLD AUTO: 80 % (ref 41–73)
NRBC # BLD AUTO: 0 K/MM3 (ref 0–0.02)
NRBC BLD AUTO-RTO: 0 /100 WBC (ref 0–0.2)
PLATELET # BLD AUTO: 254 K/MM3 (ref 150–400)
POTASSIUM SERPL-SCNC: 3.4 MMOL/L (ref 3.5–5.5)
SODIUM SERPL-SCNC: 135 MMOL/L (ref 136–145)

## 2023-06-06 NOTE — NUR
SHIFT SUMMARY
 
PT A&OX4, FLAT AFFECT. COOPERATIVE. S02>90% ON RA. TELEMETRY SHOWS SINUS TACH,
MOSTLY 100'S-110'S. BP SOFT. R RADIAL AND BASILIC SITES SOFT, NO BRUISING, NO
BLEEDING. ARM BOARD IN PLACE. PT INCONTINENT VOID X2 THIS SHIFT, FULL BED
CHANGE. NO BM THIS SHIFT. DENIES PAIN. UP TO CHAIR SBA W/ FWW TO SIT THIS
EVENING AND EAT DINNER. REPOSITIONED Q2H. CALL LIGHT IN REACH. BED ALARM ON.

## 2023-06-06 NOTE — NUR
SHIFT SUMMARY
 
PT A&Ox4, CALLS AND COMMUNICATES NEEDS APPROPRIATELY, PT WITH FLAT AFFECT. BP
SOFT WITH SBP 90's, MAP> 65, ASYMPTOMATIC. JUNCTIONAL SR-ST 's, DENIES
CP/PRESSURE. SpO2> 92% RA, DENIES SOB. PT DID NOT TRY TO GET OOB WITHOUT
STAFF. CONDOM CATH CHANGED AT START OF SHIFT, REMAINED IN PLACE, DRAINING TO
GRAVITY. NO BM THIS SHIFT. R RADIAL SITE REMAINS UNCHANGED, WNL, ARM BOARD IN
PLACE.  R BASILIC SITE REMAINS UNCHANGED, WNL, DRESSING C/D/I. BOTH SITES
WITH NO S/S OF BRUISING, BLEEDING, OR HEMATOMA. NO OTHER EVENTS, WILL REPORT
TO ONCOMING RN.

## 2023-06-07 VITALS — SYSTOLIC BLOOD PRESSURE: 90 MMHG | DIASTOLIC BLOOD PRESSURE: 71 MMHG

## 2023-06-07 VITALS — DIASTOLIC BLOOD PRESSURE: 98 MMHG | SYSTOLIC BLOOD PRESSURE: 119 MMHG

## 2023-06-07 VITALS — DIASTOLIC BLOOD PRESSURE: 76 MMHG | SYSTOLIC BLOOD PRESSURE: 104 MMHG

## 2023-06-07 VITALS — DIASTOLIC BLOOD PRESSURE: 70 MMHG | SYSTOLIC BLOOD PRESSURE: 93 MMHG

## 2023-06-07 LAB
ALBUMIN SERPL BCP-MCNC: 2.8 G/DL (ref 3.4–5)
ALBUMIN/GLOB SERPL: 0.7 {RATIO} (ref 0.8–1.8)
ALT SERPL W P-5'-P-CCNC: 55 U/L (ref 12–78)
ANION GAP SERPL CALCULATED.4IONS-SCNC: 6 MMOL/L (ref 6–16)
AST SERPL W P-5'-P-CCNC: 31 U/L (ref 12–37)
BILIRUB SERPL-MCNC: 0.5 MG/DL (ref 0.1–1)
BUN SERPL-MCNC: 36 MG/DL (ref 8–24)
CALCIUM SERPL-MCNC: 8.5 MG/DL (ref 8.5–10.1)
CHLORIDE SERPL-SCNC: 89 MMOL/L (ref 98–108)
CO2 SERPL-SCNC: 39 MMOL/L (ref 21–32)
CREAT SERPL-MCNC: 1.47 MG/DL (ref 0.6–1.2)
DEPRECATED RDW RBC AUTO: 50.6 FL (ref 35.1–46.3)
ERYTHROCYTE [DISTWIDTH] IN BLOOD BY AUTOMATED COUNT: 17 % (ref 11.7–14.2)
GLOBULIN SER CALC-MCNC: 3.8 G/DL (ref 2.2–4)
GLUCOSE SERPL-MCNC: 115 MG/DL (ref 70–99)
HCT VFR BLD AUTO: 33.9 % (ref 37–53)
HGB BLD-MCNC: 11.2 G/DL (ref 13.5–17.5)
MCHC RBC AUTO-ENTMCNC: 33 G/DL (ref 31.5–36.5)
MCV RBC AUTO: 84 FL (ref 80–100)
NRBC # BLD AUTO: 0 K/MM3 (ref 0–0.02)
NRBC BLD AUTO-RTO: 0 /100 WBC (ref 0–0.2)
PLATELET # BLD AUTO: 241 K/MM3 (ref 150–400)
POTASSIUM SERPL-SCNC: 3.5 MMOL/L (ref 3.5–5.5)
PROT SERPL-MCNC: 6.6 G/DL (ref 6.4–8.2)
SODIUM SERPL-SCNC: 134 MMOL/L (ref 136–145)

## 2023-06-07 NOTE — NUR
SHIFT SUMMARY
 
PT REMAINS A/Ox4 AND COOPERATIVE WITH CARE PROVIDED BY MEMBERS OF STAFF.
ANSWERS QUESTIONS APPROPRIATELY AND ABLE TO MAKE HIS NEEDS KNOWN. NO ACUTE
EVENTS OVERNIGHT. HX OF SCHIZOAFFECTIVE DISORDER, SO PT CAN BE IMPULSIVE AT
TIMES. EASILY REDIRECTABLE. CARDIAC SOOD, CONTINUES TO BOUNCE AROUND
ST-JUNCTIONAL RHYTHM WITH HR RANGING 'S. NO REPORTS OF CP OR PRESSURE AS
OF THIS NOTE. SBP REMAINS SOFT (80-90'S), BUT MAP >70. RESPIRATORY WISE,
MAINTAINS SPO2 >90% RA WITH NO REPORTS OF SOB OR DYSPNEA T/O THE NIGHT. ABLE
TO ABULATE TO BSC WITH 1 ASSIST W/FWW. CONTINUES TO BE INCONTINENT OF URINE,
ATTENDS IN PLACE AND CHANGED PRN. Q2 TURNS PERFORMED AS ORDERED. RIGHT RADIAL
ACCESS SITE FROM ANGIO 6/5 WNL, PROTECTIVE ARM BOARD STILL IN PLACE. BED ALARM
ON AND CHAIR ALARM ON WHEN UP IN CHAIR. NO NEW ORDERS AT THIS TIME, WILL
REPORT TO ONCOMING RN. JHOANA DU AS OF THIS NOTE

## 2023-06-07 NOTE — NUR
TRANSFER: REPORT RECEIVED FROM PCU RNRADHA. PT TO UNIT AT ABOUT 1640. PT IS
A/O, VSS. PT ORIENTED TO ROOM AND GIVEN CALL LIGHT. BED ALARM ON FOR SAFETY.
GARCIA IS WNL AND DRAINING. CATH LAB SITES AT R WRIST AND R AC ARE WNL, NO
EDEMA OR REDNESS. TELE STABLE, AND VERIFIED WITH REBIScan. PT DENIED
CP OR SOB. WILL CTM AND REPORT TO NOC RN

## 2023-06-07 NOTE — NUR
RN/DAY SHIFT SUMMARY
MORNING SHIFT REPORT RECIEVED DURING THE MORNING GREETING WITH THE PATIENT.
THE PAITENT WAS THEN ASSESSED AND MEDICATIONS WERE GIVEN WITH NO ISSUES. THE
PATIENT HAS A NOTED FLAT AFFECT BUT IS PLEASANT OTHERWISE. CONTINUEING TO
MONITOR.

## 2023-06-08 VITALS — SYSTOLIC BLOOD PRESSURE: 118 MMHG | DIASTOLIC BLOOD PRESSURE: 82 MMHG

## 2023-06-08 VITALS — DIASTOLIC BLOOD PRESSURE: 80 MMHG | SYSTOLIC BLOOD PRESSURE: 99 MMHG

## 2023-06-08 VITALS — SYSTOLIC BLOOD PRESSURE: 112 MMHG | DIASTOLIC BLOOD PRESSURE: 82 MMHG

## 2023-06-08 LAB
ALBUMIN SERPL BCP-MCNC: 2.6 G/DL (ref 3.4–5)
ALBUMIN/GLOB SERPL: 0.8 {RATIO} (ref 0.8–1.8)
ALT SERPL W P-5'-P-CCNC: 50 U/L (ref 12–78)
ANION GAP SERPL CALCULATED.4IONS-SCNC: 6 MMOL/L (ref 6–16)
AST SERPL W P-5'-P-CCNC: 29 U/L (ref 12–37)
BILIRUB SERPL-MCNC: 0.8 MG/DL (ref 0.1–1)
BUN SERPL-MCNC: 31 MG/DL (ref 8–24)
CALCIUM SERPL-MCNC: 8.7 MG/DL (ref 8.5–10.1)
CHLORIDE SERPL-SCNC: 95 MMOL/L (ref 98–108)
CO2 SERPL-SCNC: 38 MMOL/L (ref 21–32)
CREAT SERPL-MCNC: 1.3 MG/DL (ref 0.6–1.2)
DEPRECATED RDW RBC AUTO: 51.6 FL (ref 35.1–46.3)
ERYTHROCYTE [DISTWIDTH] IN BLOOD BY AUTOMATED COUNT: 17.3 % (ref 11.7–14.2)
GLOBULIN SER CALC-MCNC: 3.4 G/DL (ref 2.2–4)
GLUCOSE SERPL-MCNC: 105 MG/DL (ref 70–99)
HCT VFR BLD AUTO: 32.9 % (ref 37–53)
HGB BLD-MCNC: 10.8 G/DL (ref 13.5–17.5)
MAGNESIUM SERPL-MCNC: 2 MG/DL (ref 1.6–2.4)
MCHC RBC AUTO-ENTMCNC: 32.8 G/DL (ref 31.5–36.5)
MCV RBC AUTO: 85 FL (ref 80–100)
NRBC # BLD AUTO: 0 K/MM3 (ref 0–0.02)
NRBC BLD AUTO-RTO: 0 /100 WBC (ref 0–0.2)
PLATELET # BLD AUTO: 241 K/MM3 (ref 150–400)
POTASSIUM SERPL-SCNC: 3.5 MMOL/L (ref 3.5–5.5)
PROT SERPL-MCNC: 6 G/DL (ref 6.4–8.2)
SODIUM SERPL-SCNC: 139 MMOL/L (ref 136–145)

## 2023-06-08 NOTE — NUR
DISCHARGE
PT DISCHARGED HOME FROM UNIT AT APROX 1645. PT CARETAKER GIVEN WRITTEN AND
VERBAL INSTRUCTIONS AND VERBALIZED UNDERSTANDING. IV REMOVED. TELE REMOVED.
CARETAKER ASSISTED WITH DRESSING. WC TO CAR. NEW RX[facility].

## 2023-06-08 NOTE — NUR
EOS NOTE
 
PATIENT A/OX4, FLAT AFFECT, CAN BE IMPULSIVE. BED ALARM IS ON, CALL LIGHT
WITHIN REACH. EDUCATED PATIENT ON CALL LIGHT USAGE MULTIPLE TIMES, PATIENT
WOULD SET ALARM OFF RATHER THAN CALL FOR NEEDS. PATIENT DID NOT GRASP CALL
LIGHT USAGE. PATIENT REMAINED ON BBB 'S WITH PVC'S. FLUID RESTRICTION
REMAINS IN PLACE, PATIET FOLLOWS IT APPROPRIATLY. ACCIDENTLY PULLED CONDOM
CATH A FEW TIMES, PATIENT WAS PUT IN A BRIEF. MOSTLY INCONTINENT, SET ALARM
OFF ONCE TO ATTEMPT TO USE A URINAL BUT PATIENT HAD ALREADY SOILED HIMSELF.
ABRASIONS/SKIN TEARS THROUGHOUT IN SEEMINGLY DIFFERENT STAGES OF HEALING.
PATIENTS SKIN APPEARS THIN AND FRAGILE. VSS, CALL LIGHT WITHIN REACH, BED
ALARM ON. PATIENT GAIT IS SLOW, NOT INCREDIBLY WELL BALANCED, MOSTLY SHUFFLED.

## 2023-07-11 ENCOUNTER — HOSPITAL ENCOUNTER (INPATIENT)
Dept: HOSPITAL 95 - ER | Age: 73
LOS: 6 days | Discharge: HOSPICE | DRG: 280 | End: 2023-07-17
Attending: HOSPITALIST | Admitting: HOSPITALIST
Payer: OTHER GOVERNMENT

## 2023-07-11 VITALS — SYSTOLIC BLOOD PRESSURE: 102 MMHG | DIASTOLIC BLOOD PRESSURE: 79 MMHG

## 2023-07-11 VITALS — SYSTOLIC BLOOD PRESSURE: 95 MMHG | DIASTOLIC BLOOD PRESSURE: 81 MMHG

## 2023-07-11 VITALS — DIASTOLIC BLOOD PRESSURE: 74 MMHG | SYSTOLIC BLOOD PRESSURE: 93 MMHG

## 2023-07-11 VITALS — DIASTOLIC BLOOD PRESSURE: 55 MMHG | SYSTOLIC BLOOD PRESSURE: 77 MMHG

## 2023-07-11 VITALS — DIASTOLIC BLOOD PRESSURE: 79 MMHG | SYSTOLIC BLOOD PRESSURE: 96 MMHG

## 2023-07-11 VITALS — HEIGHT: 71 IN | BODY MASS INDEX: 22.96 KG/M2 | WEIGHT: 164.02 LBS

## 2023-07-11 VITALS — DIASTOLIC BLOOD PRESSURE: 80 MMHG | SYSTOLIC BLOOD PRESSURE: 105 MMHG

## 2023-07-11 VITALS — DIASTOLIC BLOOD PRESSURE: 79 MMHG | SYSTOLIC BLOOD PRESSURE: 102 MMHG

## 2023-07-11 VITALS — SYSTOLIC BLOOD PRESSURE: 118 MMHG | DIASTOLIC BLOOD PRESSURE: 73 MMHG

## 2023-07-11 VITALS — SYSTOLIC BLOOD PRESSURE: 78 MMHG | DIASTOLIC BLOOD PRESSURE: 43 MMHG

## 2023-07-11 VITALS — DIASTOLIC BLOOD PRESSURE: 73 MMHG | SYSTOLIC BLOOD PRESSURE: 91 MMHG

## 2023-07-11 VITALS — SYSTOLIC BLOOD PRESSURE: 91 MMHG | DIASTOLIC BLOOD PRESSURE: 67 MMHG

## 2023-07-11 VITALS — SYSTOLIC BLOOD PRESSURE: 106 MMHG | DIASTOLIC BLOOD PRESSURE: 84 MMHG

## 2023-07-11 VITALS — SYSTOLIC BLOOD PRESSURE: 81 MMHG | DIASTOLIC BLOOD PRESSURE: 66 MMHG

## 2023-07-11 VITALS — DIASTOLIC BLOOD PRESSURE: 71 MMHG | SYSTOLIC BLOOD PRESSURE: 95 MMHG

## 2023-07-11 VITALS — SYSTOLIC BLOOD PRESSURE: 116 MMHG | DIASTOLIC BLOOD PRESSURE: 85 MMHG

## 2023-07-11 VITALS — SYSTOLIC BLOOD PRESSURE: 95 MMHG | DIASTOLIC BLOOD PRESSURE: 65 MMHG

## 2023-07-11 VITALS — SYSTOLIC BLOOD PRESSURE: 92 MMHG | DIASTOLIC BLOOD PRESSURE: 65 MMHG

## 2023-07-11 VITALS — SYSTOLIC BLOOD PRESSURE: 109 MMHG | DIASTOLIC BLOOD PRESSURE: 69 MMHG

## 2023-07-11 VITALS — DIASTOLIC BLOOD PRESSURE: 60 MMHG | SYSTOLIC BLOOD PRESSURE: 96 MMHG

## 2023-07-11 DIAGNOSIS — Z79.82: ICD-10-CM

## 2023-07-11 DIAGNOSIS — E87.1: ICD-10-CM

## 2023-07-11 DIAGNOSIS — Z95.5: ICD-10-CM

## 2023-07-11 DIAGNOSIS — M54.9: ICD-10-CM

## 2023-07-11 DIAGNOSIS — I48.91: Primary | ICD-10-CM

## 2023-07-11 DIAGNOSIS — N17.9: ICD-10-CM

## 2023-07-11 DIAGNOSIS — E78.5: ICD-10-CM

## 2023-07-11 DIAGNOSIS — Z79.811: ICD-10-CM

## 2023-07-11 DIAGNOSIS — Z88.8: ICD-10-CM

## 2023-07-11 DIAGNOSIS — Z79.02: ICD-10-CM

## 2023-07-11 DIAGNOSIS — Z79.01: ICD-10-CM

## 2023-07-11 DIAGNOSIS — I27.20: ICD-10-CM

## 2023-07-11 DIAGNOSIS — G89.29: ICD-10-CM

## 2023-07-11 DIAGNOSIS — Z79.4: ICD-10-CM

## 2023-07-11 DIAGNOSIS — E87.5: ICD-10-CM

## 2023-07-11 DIAGNOSIS — D63.1: ICD-10-CM

## 2023-07-11 DIAGNOSIS — I25.10: ICD-10-CM

## 2023-07-11 DIAGNOSIS — Z79.899: ICD-10-CM

## 2023-07-11 DIAGNOSIS — Z51.5: ICD-10-CM

## 2023-07-11 DIAGNOSIS — G20: ICD-10-CM

## 2023-07-11 DIAGNOSIS — I50.23: ICD-10-CM

## 2023-07-11 DIAGNOSIS — I25.2: ICD-10-CM

## 2023-07-11 DIAGNOSIS — Z66: ICD-10-CM

## 2023-07-11 DIAGNOSIS — K21.9: ICD-10-CM

## 2023-07-11 DIAGNOSIS — I13.0: ICD-10-CM

## 2023-07-11 DIAGNOSIS — I95.9: ICD-10-CM

## 2023-07-11 DIAGNOSIS — I21.A1: ICD-10-CM

## 2023-07-11 DIAGNOSIS — F25.9: ICD-10-CM

## 2023-07-11 DIAGNOSIS — Z79.84: ICD-10-CM

## 2023-07-11 DIAGNOSIS — I25.5: ICD-10-CM

## 2023-07-11 DIAGNOSIS — N18.30: ICD-10-CM

## 2023-07-11 DIAGNOSIS — E11.22: ICD-10-CM

## 2023-07-11 LAB
ALBUMIN SERPL BCP-MCNC: 2.7 G/DL (ref 3.4–5)
ALBUMIN/GLOB SERPL: 0.8 {RATIO} (ref 0.8–1.8)
ALT SERPL W P-5'-P-CCNC: 36 U/L (ref 12–78)
ANION GAP SERPL CALCULATED.4IONS-SCNC: 13 MMOL/L (ref 6–16)
AST SERPL W P-5'-P-CCNC: 26 U/L (ref 12–37)
BASOPHILS # BLD AUTO: 0.02 K/MM3 (ref 0–0.23)
BASOPHILS NFR BLD AUTO: 0 % (ref 0–2)
BILIRUB SERPL-MCNC: 0.4 MG/DL (ref 0.1–1)
BUN SERPL-MCNC: 76 MG/DL (ref 8–24)
CALCIUM SERPL-MCNC: 7.8 MG/DL (ref 8.5–10.1)
CHLORIDE SERPL-SCNC: 109 MMOL/L (ref 98–108)
CO2 SERPL-SCNC: 20 MMOL/L (ref 21–32)
CREAT SERPL-MCNC: 2.02 MG/DL (ref 0.6–1.2)
DEPRECATED RDW RBC AUTO: 57.8 FL (ref 35.1–46.3)
EOSINOPHIL # BLD AUTO: 0.06 K/MM3 (ref 0–0.68)
EOSINOPHIL NFR BLD AUTO: 1 % (ref 0–6)
ERYTHROCYTE [DISTWIDTH] IN BLOOD BY AUTOMATED COUNT: 17.6 % (ref 11.7–14.2)
GLOBULIN SER CALC-MCNC: 3.4 G/DL (ref 2.2–4)
GLUCOSE SERPL-MCNC: 192 MG/DL (ref 70–99)
HCT VFR BLD AUTO: 32.7 % (ref 37–53)
HGB BLD-MCNC: 10.4 G/DL (ref 13.5–17.5)
IMM GRANULOCYTES # BLD AUTO: 0.03 K/MM3 (ref 0–0.1)
IMM GRANULOCYTES NFR BLD AUTO: 0 % (ref 0–1)
LYMPHOCYTES # BLD AUTO: 0.51 K/MM3 (ref 0.84–5.2)
LYMPHOCYTES NFR BLD AUTO: 7 % (ref 21–46)
MCHC RBC AUTO-ENTMCNC: 31.8 G/DL (ref 31.5–36.5)
MCV RBC AUTO: 90 FL (ref 80–100)
MONOCYTES # BLD AUTO: 0.37 K/MM3 (ref 0.16–1.47)
MONOCYTES NFR BLD AUTO: 5 % (ref 4–13)
NEUTROPHILS # BLD AUTO: 6.41 K/MM3 (ref 1.96–9.15)
NEUTROPHILS NFR BLD AUTO: 87 % (ref 41–73)
NRBC # BLD AUTO: 0 K/MM3 (ref 0–0.02)
NRBC BLD AUTO-RTO: 0 /100 WBC (ref 0–0.2)
PLATELET # BLD AUTO: 227 K/MM3 (ref 150–400)
POTASSIUM SERPL-SCNC: 4.2 MMOL/L (ref 3.5–5.5)
PROT SERPL-MCNC: 6.1 G/DL (ref 6.4–8.2)
SODIUM SERPL-SCNC: 142 MMOL/L (ref 136–145)

## 2023-07-11 PROCEDURE — A9270 NON-COVERED ITEM OR SERVICE: HCPCS

## 2023-07-11 NOTE — NUR
ASSUMED CARE OF PT AT 0715
BEDSIDE REPORT RECEIVED FROM NESHA SNOW. PT IS A/O X4, PLEASANT AND
COOPERATIVE. AFIB 120-130, AWAITING AMIODARONE DRIP FROM PHARMACY. BP
102/79 MAP 70,
PER DR. QUISPE, OK TO KEEP MAP >/= 65. ELEQUIS ORDERED. PLACED ON 2L NC
DUE TO SUSTAINED DESATURATION WITH O2 SAT 68-75% WITH EXERTION. OOB TO BSC
WITH MINIMAL ASSIST. BM X1, VOIDS USING BSC OR URINAL AFTER LASIX GIVEN. SKIN
UNCHANGED, ADVISED PT OF PLAN TO REPOSITION EVERY 2 HOURS TO MAINTAIN SKIN
INTEGRITY. MED REC NOT COMPLETED, CAREGIVER TO BRING IN CURRENT LIST OF
MEDICATIONS TOMORROW MORNING.
RN TO CONTINUE TO MONITOR

## 2023-07-11 NOTE — NUR
ICU ADMISSION:
REPORT RECEIVED FROM NESHA PATINO IN ED. PT ARRIVED TO ICU-03 AT APPROX 1820.
ON ARRIVAL, THE PT IS A&O TO ALL, ALTHOUGH NOTED TO BE A POOR HISTORIAN WHEN
QUESTIONED ABOUT RECENT PRIOR ADMISSION. HE VISIBLY DYSNPEIC WHILE LAYING
FLAT. LS COARSE/ DIM T/O, PT ON RA W/ O2 SATS > 92%. MONITOR SHOWS AFIB W/ HR
110-150s, BP STABLE. PT HAS NO GI COMPLAINTS & IS REQUESTING TO EAT. DR QUISPE AT BEDSIDE COMPLETING CONSULTATION EVAL AT THAT TIME & STS THE PT IS
OKAY TO EAT. LASIX ORDERED & GIVEN PER EMAR, THE PT STS HE WILL BE ABLE TO USE
A URINAL. NO VOID SINCE ADMIT TO ICU. SKIN CONDITION OVERALL INTACT,
ECCHYMOTIC. COCCYX IS SLIGHTLY PINK/ REDENNED, BLANCHABLE.
PT's CAREGIVER, LOIS, IS GOING TO BRING A COPY OF HIS HOME MEDICATION LIST IN
AM.
WILL CONTINUE TO MONITOR & REPORT OFF TO ONCOMING RN.

## 2023-07-12 VITALS — DIASTOLIC BLOOD PRESSURE: 87 MMHG | SYSTOLIC BLOOD PRESSURE: 106 MMHG

## 2023-07-12 VITALS — DIASTOLIC BLOOD PRESSURE: 84 MMHG | SYSTOLIC BLOOD PRESSURE: 111 MMHG

## 2023-07-12 VITALS — SYSTOLIC BLOOD PRESSURE: 110 MMHG | DIASTOLIC BLOOD PRESSURE: 91 MMHG

## 2023-07-12 VITALS — DIASTOLIC BLOOD PRESSURE: 72 MMHG | SYSTOLIC BLOOD PRESSURE: 107 MMHG

## 2023-07-12 VITALS — DIASTOLIC BLOOD PRESSURE: 88 MMHG | SYSTOLIC BLOOD PRESSURE: 112 MMHG

## 2023-07-12 VITALS — SYSTOLIC BLOOD PRESSURE: 93 MMHG | DIASTOLIC BLOOD PRESSURE: 63 MMHG

## 2023-07-12 VITALS — DIASTOLIC BLOOD PRESSURE: 103 MMHG | SYSTOLIC BLOOD PRESSURE: 134 MMHG

## 2023-07-12 VITALS — DIASTOLIC BLOOD PRESSURE: 91 MMHG | SYSTOLIC BLOOD PRESSURE: 123 MMHG

## 2023-07-12 VITALS — DIASTOLIC BLOOD PRESSURE: 96 MMHG | SYSTOLIC BLOOD PRESSURE: 115 MMHG

## 2023-07-12 VITALS — SYSTOLIC BLOOD PRESSURE: 115 MMHG | DIASTOLIC BLOOD PRESSURE: 84 MMHG

## 2023-07-12 VITALS — SYSTOLIC BLOOD PRESSURE: 130 MMHG | DIASTOLIC BLOOD PRESSURE: 97 MMHG

## 2023-07-12 VITALS — DIASTOLIC BLOOD PRESSURE: 85 MMHG | SYSTOLIC BLOOD PRESSURE: 103 MMHG

## 2023-07-12 VITALS — DIASTOLIC BLOOD PRESSURE: 96 MMHG | SYSTOLIC BLOOD PRESSURE: 112 MMHG

## 2023-07-12 VITALS — SYSTOLIC BLOOD PRESSURE: 108 MMHG | DIASTOLIC BLOOD PRESSURE: 95 MMHG

## 2023-07-12 VITALS — SYSTOLIC BLOOD PRESSURE: 103 MMHG | DIASTOLIC BLOOD PRESSURE: 67 MMHG

## 2023-07-12 VITALS — DIASTOLIC BLOOD PRESSURE: 86 MMHG | SYSTOLIC BLOOD PRESSURE: 104 MMHG

## 2023-07-12 VITALS — DIASTOLIC BLOOD PRESSURE: 90 MMHG | SYSTOLIC BLOOD PRESSURE: 108 MMHG

## 2023-07-12 VITALS — SYSTOLIC BLOOD PRESSURE: 114 MMHG | DIASTOLIC BLOOD PRESSURE: 103 MMHG

## 2023-07-12 VITALS — SYSTOLIC BLOOD PRESSURE: 124 MMHG | DIASTOLIC BLOOD PRESSURE: 97 MMHG

## 2023-07-12 VITALS — DIASTOLIC BLOOD PRESSURE: 94 MMHG | SYSTOLIC BLOOD PRESSURE: 120 MMHG

## 2023-07-12 VITALS — SYSTOLIC BLOOD PRESSURE: 122 MMHG | DIASTOLIC BLOOD PRESSURE: 98 MMHG

## 2023-07-12 VITALS — DIASTOLIC BLOOD PRESSURE: 78 MMHG | SYSTOLIC BLOOD PRESSURE: 139 MMHG

## 2023-07-12 VITALS — DIASTOLIC BLOOD PRESSURE: 77 MMHG | SYSTOLIC BLOOD PRESSURE: 123 MMHG

## 2023-07-12 VITALS — DIASTOLIC BLOOD PRESSURE: 89 MMHG | SYSTOLIC BLOOD PRESSURE: 107 MMHG

## 2023-07-12 VITALS — SYSTOLIC BLOOD PRESSURE: 123 MMHG | DIASTOLIC BLOOD PRESSURE: 86 MMHG

## 2023-07-12 VITALS — SYSTOLIC BLOOD PRESSURE: 116 MMHG | DIASTOLIC BLOOD PRESSURE: 86 MMHG

## 2023-07-12 VITALS — DIASTOLIC BLOOD PRESSURE: 99 MMHG | SYSTOLIC BLOOD PRESSURE: 116 MMHG

## 2023-07-12 LAB
ALBUMIN SERPL BCP-MCNC: 2.8 G/DL (ref 3.4–5)
ALBUMIN/GLOB SERPL: 0.7 {RATIO} (ref 0.8–1.8)
ALT SERPL W P-5'-P-CCNC: 49 U/L (ref 12–78)
ANION GAP SERPL CALCULATED.4IONS-SCNC: 10 MMOL/L (ref 6–16)
AST SERPL W P-5'-P-CCNC: 33 U/L (ref 12–37)
BASOPHILS # BLD AUTO: 0.02 K/MM3 (ref 0–0.23)
BASOPHILS NFR BLD AUTO: 0 % (ref 0–2)
BILIRUB SERPL-MCNC: 0.6 MG/DL (ref 0.1–1)
BUN SERPL-MCNC: 78 MG/DL (ref 8–24)
CALCIUM SERPL-MCNC: 7.9 MG/DL (ref 8.5–10.1)
CHLORIDE SERPL-SCNC: 106 MMOL/L (ref 98–108)
CO2 SERPL-SCNC: 21 MMOL/L (ref 21–32)
CREAT SERPL-MCNC: 2.07 MG/DL (ref 0.6–1.2)
DEPRECATED RDW RBC AUTO: 55.6 FL (ref 35.1–46.3)
EOSINOPHIL # BLD AUTO: 0.05 K/MM3 (ref 0–0.68)
EOSINOPHIL NFR BLD AUTO: 1 % (ref 0–6)
ERYTHROCYTE [DISTWIDTH] IN BLOOD BY AUTOMATED COUNT: 17.6 % (ref 11.7–14.2)
GLOBULIN SER CALC-MCNC: 3.9 G/DL (ref 2.2–4)
GLUCOSE SERPL-MCNC: 242 MG/DL (ref 70–99)
HCT VFR BLD AUTO: 36.1 % (ref 37–53)
HGB BLD-MCNC: 11.7 G/DL (ref 13.5–17.5)
IMM GRANULOCYTES # BLD AUTO: 0.01 K/MM3 (ref 0–0.1)
IMM GRANULOCYTES NFR BLD AUTO: 0 % (ref 0–1)
LYMPHOCYTES # BLD AUTO: 0.81 K/MM3 (ref 0.84–5.2)
LYMPHOCYTES NFR BLD AUTO: 12 % (ref 21–46)
MCHC RBC AUTO-ENTMCNC: 32.4 G/DL (ref 31.5–36.5)
MCV RBC AUTO: 87 FL (ref 80–100)
MONOCYTES # BLD AUTO: 0.34 K/MM3 (ref 0.16–1.47)
MONOCYTES NFR BLD AUTO: 5 % (ref 4–13)
NEUTROPHILS # BLD AUTO: 5.66 K/MM3 (ref 1.96–9.15)
NEUTROPHILS NFR BLD AUTO: 82 % (ref 41–73)
NRBC # BLD AUTO: 0 K/MM3 (ref 0–0.02)
NRBC BLD AUTO-RTO: 0 /100 WBC (ref 0–0.2)
PLATELET # BLD AUTO: 268 K/MM3 (ref 150–400)
POTASSIUM SERPL-SCNC: 4.4 MMOL/L (ref 3.5–5.5)
PROT SERPL-MCNC: 6.7 G/DL (ref 6.4–8.2)
SODIUM SERPL-SCNC: 137 MMOL/L (ref 136–145)

## 2023-07-12 NOTE — NUR
ASSUMPTION OF CARE
PT IS AWAKE DURING BEDSIDE REPORT. HE IS ALERT AND ORIENTED WITH FLAT AFFECT.
HE IS RECEIVING AMIODARONE 0.5MG/HR. AFIB ON MONITOR WITH RATE 130S. SBP
110S-130S. HE DENIES CP AND PALPITATIONS AT THIS TIME. HE IS ON 2L NC. HE
APPEARS ORTHOPNEIC BUT DENIES SYMPTOMS. LUNGS ARE CLEAR. BOWEL TONES ACTIVE,
ABDOMEN SOFT. PT DENIES NEEDING TO VOID AT THIS TIME. BED ALARM ON, BED IN LOW
POSITION, CALL LIGHT WITHIN REACH. SEE SHIFT ASSESSMENT.

## 2023-07-12 NOTE — NUR
UPDATE
HOSPITALIST AND CARDIOLOGIST ROUNDED THIS AM. PLAN TO TRANSITION TO PCU. PER
CARDIOLOGIST, START PO AMIODARONE BID STARTING THIS AM. CONTINUE AMIODARONE
DRIP UNTIL THIS EVENING WHEN DRIP IS COMPLETE.
PT CURRENTLY EATING BREAKFAST.

## 2023-07-12 NOTE — NUR
NOC SHIFT SUMMARY
PT IS A/O, SLEPT OFF AND ON, FLAT AFFECT. AFIB RATE 110-130, AMIODARONE
INFUSING AT 0.5, RATE DECREASED AT 0300. BP STABLE, GOAL MAP 65 PER
CARDIOLOGIST. 2 L NC WHILE ASLEEP, INTERMITTENT EPISODES OF APNEA. TOLERATING
DIET WELL, NO NAUSEA. OCCASIONALLY INCONTINENT, USES URINAL WHEN AWAKE. SKIN
INTACT, DISCOLORATION DUE TO VITILIGO. PIV RIGHT FOREARM, INFUSING WITH GOOD
BLOOD RETURN. PIV LEFT WRIST SL, FLUSHES WELL. NO VISITORS AT BEDSIDE
CURRENTLY, PT IS EXPECTING A CAREGIVER TO VISIT THIS AM.
RN TO CONTINUE TO MONITOR.

## 2023-07-12 NOTE — NUR
SHIFT SUMMARY
PT CONTINUES TO RECEIVE AMIODARONE 0.5MG/HR.  PLAN TO CONTINUE INFUSION UNTIL
2100 AND CONTINUE PO AMIODARONE AS SCHEDULED PER DR QUISPE. PT IS ALERT AND
ORIENTED, USES CALL LIGHT APPROPRIATELY. HE IS OCCASIONALLY ORTHOPNEIC AND SOB
WITH EXCERTION. 2L NC FOR COMFORT. LUNGS ARE CLEAR, DIMINISHED IN BASES. GOOD
PO INTAKE THROUGHOUT THE DAY. BOWEL TONES ACTIVE, ABDOMEN SOFT. 1 SMALL BM
THIS SHIFT. PT HAS HAD MULTIPLE INCONTINENT VOIDS AND TWO CONTINENT VOIDS.
GLUCOSE 424, HOSPITALIST NOTIFIED AND TRANSITIONED TO HIGH SLIDING SCALE.
HOSPICE REFERRAL FAXED BY CASE MANAGMENT. BED IN LOW POSITION AND CALL LIGHT
WITHIN REACH.

## 2023-07-12 NOTE — NUR
Pt resting in bed and is A&OX4. Pt with flat affect. Pt denies pain at this
time. Mild dyspnea noted and is wearing O2 via NC. Engaged in therapeutic
conversation regarding goals of care. Discussed options including current plan
of care and considering hospice. Educated on hospice philosophy with V/U made
by Pt. Discussed comfort care during hospital stay. Pt would like to focus on
comfort and home with [facility]. Pt confirms living with caregiver Kaylene
and Pt's Healthcare Proxy is Kaylene's mother Lora. He is agreeable for this RN
to call Kaylene and inform her of goals of care.
 
Called and spoke with Kaylene. Relayed Pt's wishes with Kaylene reporting being in
agreement.
 
Spoke with Dr Hancock and discussed case. Dr Hancock signs hospice orders.
 
Called and spoke with Brenda from [facility]. Plan for [facility]
to admit on Friday.
 
Caremanager office will fax documents to [facility].
 
Palliative Care will remain available

## 2023-07-12 NOTE — NUR
Spiritual Care Visit | Pt. request
Pt. is awake in bed and welcomes my visit. This  expressed gratitude
for the Pts.  service. Pt. verbally affirmed that he was a man of
bladimir. Listened with empathy and a calming presence.  Pt. displayed evidence
of being guarded when attempts were made to facilitate a life review. Pt. also
displayed evidence of being alert and aware of his present circumstances.
Prayed with Pt. Pt. verbalized gratitude for the spiritual care visit.

## 2023-07-13 VITALS — SYSTOLIC BLOOD PRESSURE: 99 MMHG | DIASTOLIC BLOOD PRESSURE: 79 MMHG

## 2023-07-13 VITALS — SYSTOLIC BLOOD PRESSURE: 100 MMHG | DIASTOLIC BLOOD PRESSURE: 78 MMHG

## 2023-07-13 VITALS — SYSTOLIC BLOOD PRESSURE: 106 MMHG | DIASTOLIC BLOOD PRESSURE: 83 MMHG

## 2023-07-13 VITALS — DIASTOLIC BLOOD PRESSURE: 99 MMHG | SYSTOLIC BLOOD PRESSURE: 116 MMHG

## 2023-07-13 VITALS — DIASTOLIC BLOOD PRESSURE: 80 MMHG | SYSTOLIC BLOOD PRESSURE: 99 MMHG

## 2023-07-13 VITALS — SYSTOLIC BLOOD PRESSURE: 100 MMHG | DIASTOLIC BLOOD PRESSURE: 82 MMHG

## 2023-07-13 VITALS — DIASTOLIC BLOOD PRESSURE: 76 MMHG | SYSTOLIC BLOOD PRESSURE: 102 MMHG

## 2023-07-13 VITALS — SYSTOLIC BLOOD PRESSURE: 109 MMHG | DIASTOLIC BLOOD PRESSURE: 91 MMHG

## 2023-07-13 VITALS — DIASTOLIC BLOOD PRESSURE: 81 MMHG | SYSTOLIC BLOOD PRESSURE: 99 MMHG

## 2023-07-13 VITALS — SYSTOLIC BLOOD PRESSURE: 110 MMHG | DIASTOLIC BLOOD PRESSURE: 84 MMHG

## 2023-07-13 VITALS — SYSTOLIC BLOOD PRESSURE: 99 MMHG | DIASTOLIC BLOOD PRESSURE: 80 MMHG

## 2023-07-13 VITALS — DIASTOLIC BLOOD PRESSURE: 73 MMHG | SYSTOLIC BLOOD PRESSURE: 104 MMHG

## 2023-07-13 LAB
ANION GAP SERPL CALCULATED.4IONS-SCNC: 10 MMOL/L (ref 6–16)
BASOPHILS # BLD AUTO: 0.02 K/MM3 (ref 0–0.23)
BASOPHILS NFR BLD AUTO: 0 % (ref 0–2)
BUN SERPL-MCNC: 73 MG/DL (ref 8–24)
CALCIUM SERPL-MCNC: 8.2 MG/DL (ref 8.5–10.1)
CHLORIDE SERPL-SCNC: 105 MMOL/L (ref 98–108)
CO2 SERPL-SCNC: 21 MMOL/L (ref 21–32)
CREAT SERPL-MCNC: 1.81 MG/DL (ref 0.6–1.2)
DEPRECATED RDW RBC AUTO: 53.5 FL (ref 35.1–46.3)
EOSINOPHIL # BLD AUTO: 0.01 K/MM3 (ref 0–0.68)
EOSINOPHIL NFR BLD AUTO: 0 % (ref 0–6)
ERYTHROCYTE [DISTWIDTH] IN BLOOD BY AUTOMATED COUNT: 17.5 % (ref 11.7–14.2)
GLUCOSE SERPL-MCNC: 133 MG/DL (ref 70–99)
HCT VFR BLD AUTO: 32.5 % (ref 37–53)
HGB BLD-MCNC: 11 G/DL (ref 13.5–17.5)
IMM GRANULOCYTES # BLD AUTO: 0.02 K/MM3 (ref 0–0.1)
IMM GRANULOCYTES NFR BLD AUTO: 0 % (ref 0–1)
LYMPHOCYTES # BLD AUTO: 0.59 K/MM3 (ref 0.84–5.2)
LYMPHOCYTES NFR BLD AUTO: 8 % (ref 21–46)
MCHC RBC AUTO-ENTMCNC: 33.8 G/DL (ref 31.5–36.5)
MCV RBC AUTO: 84 FL (ref 80–100)
MONOCYTES # BLD AUTO: 0.67 K/MM3 (ref 0.16–1.47)
MONOCYTES NFR BLD AUTO: 9 % (ref 4–13)
NEUTROPHILS # BLD AUTO: 6.34 K/MM3 (ref 1.96–9.15)
NEUTROPHILS NFR BLD AUTO: 83 % (ref 41–73)
NRBC # BLD AUTO: 0 K/MM3 (ref 0–0.02)
NRBC BLD AUTO-RTO: 0 /100 WBC (ref 0–0.2)
PLATELET # BLD AUTO: 241 K/MM3 (ref 150–400)
POTASSIUM SERPL-SCNC: 4 MMOL/L (ref 3.5–5.5)
SODIUM SERPL-SCNC: 136 MMOL/L (ref 136–145)

## 2023-07-13 NOTE — NUR
SHIFT SUMMARY
 
PATIENT HR REMAINED ELEVATED 'S. SEE HR-METOPROLOL NOTE. HR . BP
STABLE WITH MAPS GREATER THAN 65. PATIENT C/O DYSPNEA THIS EVENING AFTER
DINNER-LUNG SOUNDS CLEAR, SPO2 99% ON 2LPM VIA NC. O2 INCREASED TO 3LPM VIA NC
FOR PATIENT COMFORT. PATIENT HAD 1 BM AND 325ML URINE OUT THIS SHIFT. [place] [facility] [place] 1 PERSON ASSIST. NO OTHER CHANGES DURING
SHIFT.

## 2023-07-13 NOTE — NUR
ASSUMED CARE
ASSUMED CARE OF PATIENT.  AWAKE AND ALERT.  SPEECH IS SOFT AND SLOW.
OCCASIONAL CONFUSED CONVERSATION, BUT PT IS ORIENTED X 3.  CONTINUES WITH
ORTHOPNEA.  O2 4L NC. DYSPNEA NOTED WITH ANY EXERTION.  PT IS SLIGHTLY ANXIOUS
BUT CALMS WITH REASSURANCE.  MONITOR SHOWS AFLUTTER, RATE 120s.  SEE SHIFT
ASSESSMENT FOR FULL ASSESSMENT.

## 2023-07-13 NOTE — NUR
Spiritual Care Visit.
Pt. is awake in bed and welcomes my visit. Pt. is pleasant. Since Pt. is
preparing to go home on hospice this  asked questions regarding the
Pts.  spiritual background. Considered matters of bladimir and belief, and Pt.
expanded on his personal bladimir background. Pt. displays evidence of a low key
demeanor, but verbalizes looking forward to going home to be with his
caregiver. Gave prayer of blessing for the Pt. Pt. verbalized gratitude for
the prayer and the spiritual care visit(s) and welcomed this  to
return.

## 2023-07-13 NOTE — NUR
HEART RATE-METOPROLOL
 
CALL MADE TO DR. QUISPE TO DISCUSS PATIENT HR  SUSTAINING AND WAS
ADVISED TO CALL DR. THOMAS FOR ORDERS. CALL MADE TO DR. THOMAS AND ADVISED THAT
CARDIOLOGY HAS SIGNED OFF ON PATIENT'S CASE. CALL MADE TO HOSPITALIST DR. PÉREZ AND ORDER RECEIVED FOR METOPROLOL 12.5MG PO BID.

## 2023-07-13 NOTE — NUR
ASSUMED CARE
 
PATIENT LYING IN BED WITH EYES CLOSED. AWAKENS WHEN STAFF ENTERS ROOM. VOICE
IS SOFT, BUT SPEECH IS APPROPRIATE. ASKS QUESTIONS REGARDING CARE. NO FAMILY
OR VISITORS AT BEDSIDE. NO MEDICATIONS INFUSING. CALL LIGHT IN REACH.

## 2023-07-13 NOTE — NUR
IGNITION RISK
 
PATIENT EDUCATED AND FIRE/IGNITION RISK RELATED TO OXYGEN USE. PATIENT DENIES
HAVING ANY IGNITION SOURCES IN POSSESSION AT THIS TIME.

## 2023-07-13 NOTE — NUR
Pt resting in bed upon arrival. Pt denies pain at this time. Pt does appear
dyspneic as evidenced by work of breathing. Pt's caregiver Kaylene at bedside. Pt
and caregiver report Pt wanting to go home. Due to Kaylene's availabilty to pick
Pt up until not after 1700 Friday, [facility] can not admit Pt onto
services until Monday. Due to Pt's current condition, Pt will need a same day
admission for hospice. Explained this to Kaylene who is in agreement and reports
her  can pick Pt up earlier on Monday. No other concerns at this time.
 
Palliative Care will remain available

## 2023-07-14 VITALS — DIASTOLIC BLOOD PRESSURE: 83 MMHG | SYSTOLIC BLOOD PRESSURE: 114 MMHG

## 2023-07-14 VITALS — DIASTOLIC BLOOD PRESSURE: 86 MMHG | SYSTOLIC BLOOD PRESSURE: 101 MMHG

## 2023-07-14 VITALS — SYSTOLIC BLOOD PRESSURE: 106 MMHG | DIASTOLIC BLOOD PRESSURE: 77 MMHG

## 2023-07-14 VITALS — DIASTOLIC BLOOD PRESSURE: 86 MMHG | SYSTOLIC BLOOD PRESSURE: 109 MMHG

## 2023-07-14 VITALS — DIASTOLIC BLOOD PRESSURE: 75 MMHG | SYSTOLIC BLOOD PRESSURE: 99 MMHG

## 2023-07-14 VITALS — SYSTOLIC BLOOD PRESSURE: 108 MMHG | DIASTOLIC BLOOD PRESSURE: 83 MMHG

## 2023-07-14 VITALS — DIASTOLIC BLOOD PRESSURE: 83 MMHG | SYSTOLIC BLOOD PRESSURE: 107 MMHG

## 2023-07-14 VITALS — DIASTOLIC BLOOD PRESSURE: 77 MMHG | SYSTOLIC BLOOD PRESSURE: 104 MMHG

## 2023-07-14 VITALS — DIASTOLIC BLOOD PRESSURE: 70 MMHG | SYSTOLIC BLOOD PRESSURE: 93 MMHG

## 2023-07-14 LAB
ALBUMIN SERPL BCP-MCNC: 2.7 G/DL (ref 3.4–5)
ANION GAP SERPL CALCULATED.4IONS-SCNC: 9 MMOL/L (ref 6–16)
BUN SERPL-MCNC: 74 MG/DL (ref 8–24)
CALCIUM SERPL-MCNC: 8.2 MG/DL (ref 8.5–10.1)
CHLORIDE SERPL-SCNC: 102 MMOL/L (ref 98–108)
CO2 SERPL-SCNC: 23 MMOL/L (ref 21–32)
CREAT SERPL-MCNC: 1.8 MG/DL (ref 0.6–1.2)
GLUCOSE SERPL-MCNC: 233 MG/DL (ref 70–99)
HCT VFR BLD AUTO: 32.2 % (ref 37–53)
HGB BLD-MCNC: 11.2 G/DL (ref 13.5–17.5)
MAGNESIUM SERPL-MCNC: 1.9 MG/DL (ref 1.6–2.4)
PHOSPHATE SERPL-MCNC: 3.4 MG/DL (ref 2.5–4.9)
POTASSIUM SERPL-SCNC: 5.1 MMOL/L (ref 3.5–5.5)
SODIUM SERPL-SCNC: 134 MMOL/L (ref 136–145)

## 2023-07-14 NOTE — NUR
TRANSFER:
 
PT TRANSFERRED TO [place] [address] VIA WHEELCHAIR, REPORT CALLED TO NESHA BEY. ALL
BELONINGS WITH PT.

## 2023-07-14 NOTE — NUR
FIRE SAFETY:
 
PT EDUCATED ON RISK REGARDING IGNITION SOURCES AND RISK OF INJURY WHILE OXYGEN
IS IN USE. PATIENT DENIES SMOKING AND VERBALIZES UNDERSTANDING.

## 2023-07-14 NOTE — NUR
SHIFT SUMMARY
PATIENT TRANSFERRED FROM ICU TO PCU 2. PATIENT IS ALERT AND ORIENTED, WEAK ON
HIS FEET AND REQUIRES 1 ASSIST TO BEDSIDE COMMODE. PATIENT'S HEART RATE HAS
BEEN 126-128, GIVEN FIRST DOSE IV DIGOXIN. BLOOD PRESSURE STABLE. SPO2 100% ON
2 LITERS. PATIENT REPORTED HAVING SHORTNESS OF BREATH SHORTLY AFTER TRANSFER
WITH ACTIVITY, DENIES CHEST PAIN. PATIENT EDUCATED ON FIRE SAFETY AND IGNITION
RISK IN THE HOSPITAL. WILL CONTINUE TO MONITOR. CALL LIGHT WITHIN REACH.

## 2023-07-14 NOTE — NUR
PCU TRANSFER
Patient alert & oriented x4. Telemetry in place, sinus tach @ 121. Patient
denies discomfort/pain. SBAx1, weakness with ambulation.
Provided education to patient r/t ignition sources and risk of injury when
oxygen in use. Patient verbalized understanding and denies smoking and or
ignition sources in possession. Vital signs stable, afebrile. Plan to
discharge home monday, Will continue plan of care.

## 2023-07-15 VITALS — DIASTOLIC BLOOD PRESSURE: 92 MMHG | SYSTOLIC BLOOD PRESSURE: 111 MMHG

## 2023-07-15 VITALS — SYSTOLIC BLOOD PRESSURE: 112 MMHG | DIASTOLIC BLOOD PRESSURE: 84 MMHG

## 2023-07-15 VITALS — SYSTOLIC BLOOD PRESSURE: 108 MMHG | DIASTOLIC BLOOD PRESSURE: 79 MMHG

## 2023-07-15 VITALS — DIASTOLIC BLOOD PRESSURE: 93 MMHG | SYSTOLIC BLOOD PRESSURE: 114 MMHG

## 2023-07-15 VITALS — DIASTOLIC BLOOD PRESSURE: 94 MMHG | SYSTOLIC BLOOD PRESSURE: 121 MMHG

## 2023-07-15 LAB
ANION GAP SERPL CALCULATED.4IONS-SCNC: 12 MMOL/L (ref 6–16)
BUN SERPL-MCNC: 81 MG/DL (ref 8–24)
CALCIUM SERPL-MCNC: 8.6 MG/DL (ref 8.5–10.1)
CHLORIDE SERPL-SCNC: 96 MMOL/L (ref 98–108)
CO2 SERPL-SCNC: 22 MMOL/L (ref 21–32)
CREAT SERPL-MCNC: 2 MG/DL (ref 0.6–1.2)
GLUCOSE SERPL-MCNC: 172 MG/DL (ref 70–99)
HCT VFR BLD AUTO: 34.5 % (ref 37–53)
HGB BLD-MCNC: 11.5 G/DL (ref 13.5–17.5)
POTASSIUM SERPL-SCNC: 4.7 MMOL/L (ref 3.5–5.5)
SODIUM SERPL-SCNC: 130 MMOL/L (ref 136–145)

## 2023-07-15 NOTE — NUR
SHIFT SUMMARY:
Pt remains A&O x3 this shift. Forgetful at times. Generalized weakness noted.
Increased shortness of breath when getting back in bed with ast from BSC. Lung
sounds diminished increased on right side. Incontinent of bladder, bsc x4 for
formed BM today. Decreased appetite. Denies pain. No further ast needed at
this time. Will continue to monitor this shift.

## 2023-07-15 NOTE — NUR
MONITOR TELE
SPOKE WITH DR MOORE ABOUT PT DNR STATUS AND THAT TELE WAS REPORTING
ELEVATED HR WITH ST ELEVATION PT SHOWS NO DISTRESS VITAL SIGNS STABLE NO C/O
CHEST PAIN. TELE DCED

## 2023-07-15 NOTE — NUR
SHIFT SUMMARY NOC
 
PT A/O X 3-4. CONFUSED AND REPEATS SELF AT TIMES. PT ON TELE RUNNING SINUS
TACH @ 122 BPM. NO ACUTE EVENTS TO REPORT. PT EDUCATED ON [facility]
IGNITION/EXPLOSIVES NON SMOKING SAFETY POLICY.  INCONTINENT OF URINE X 1. PT
IS SCHEDULED TO DISCHARGE ON HOME HOSPICE ON MONDAY 7/17/23. PT IS CURRENTLY
RESTING WITH BED ALARM ON, BED IN LOWEST POSITION, AND CALL LIGHT WITHIN
REACH.

## 2023-07-16 VITALS — DIASTOLIC BLOOD PRESSURE: 94 MMHG | SYSTOLIC BLOOD PRESSURE: 116 MMHG

## 2023-07-16 VITALS — DIASTOLIC BLOOD PRESSURE: 90 MMHG | SYSTOLIC BLOOD PRESSURE: 118 MMHG

## 2023-07-16 VITALS — SYSTOLIC BLOOD PRESSURE: 119 MMHG | DIASTOLIC BLOOD PRESSURE: 86 MMHG

## 2023-07-16 VITALS — SYSTOLIC BLOOD PRESSURE: 97 MMHG | DIASTOLIC BLOOD PRESSURE: 83 MMHG

## 2023-07-16 VITALS — SYSTOLIC BLOOD PRESSURE: 113 MMHG | DIASTOLIC BLOOD PRESSURE: 96 MMHG

## 2023-07-16 LAB
ALBUMIN SERPL BCP-MCNC: 2.9 G/DL (ref 3.4–5)
ANION GAP SERPL CALCULATED.4IONS-SCNC: 12 MMOL/L (ref 6–16)
BUN SERPL-MCNC: 77 MG/DL (ref 8–24)
CALCIUM SERPL-MCNC: 8.4 MG/DL (ref 8.5–10.1)
CHLORIDE SERPL-SCNC: 100 MMOL/L (ref 98–108)
CO2 SERPL-SCNC: 19 MMOL/L (ref 21–32)
CREAT SERPL-MCNC: 1.8 MG/DL (ref 0.6–1.2)
GLUCOSE SERPL-MCNC: 93 MG/DL (ref 70–99)
HCT VFR BLD AUTO: 35.6 % (ref 37–53)
HGB BLD-MCNC: 11.9 G/DL (ref 13.5–17.5)
PHOSPHATE SERPL-MCNC: 4.2 MG/DL (ref 2.5–4.9)
POTASSIUM SERPL-SCNC: 5.1 MMOL/L (ref 3.5–5.5)
SODIUM SERPL-SCNC: 131 MMOL/L (ref 136–145)

## 2023-07-16 NOTE — NUR
SHIFT SUMMARY
PT THOUGH AT TIMES CONFUSED PT IS PLEASENT AMD EASILY REORIENTED TO
HOSPITAL. NO C/O PAIN NO DISTRESS PT SO FAR HAS CALLED APPROPRIATEY POSSIBLE
DISCHARGE ON MONDAY BACK TO [facility] WITH CNAS.

## 2023-07-16 NOTE — NUR
SHIFT SUMMARY:
Pt remains A&Ox3 this shift. Forgetful at times. Denies pain. Increased SOB
with activity. 2L nc in place. Skin pale. Generalized weakness noted.
Decreased appetite. No needs id or verbalized at this time. Will continue to
monitor this shift.

## 2023-07-17 VITALS — DIASTOLIC BLOOD PRESSURE: 100 MMHG | SYSTOLIC BLOOD PRESSURE: 144 MMHG

## 2023-07-17 VITALS — SYSTOLIC BLOOD PRESSURE: 107 MMHG | DIASTOLIC BLOOD PRESSURE: 83 MMHG

## 2023-07-17 LAB
ANION GAP SERPL CALCULATED.4IONS-SCNC: 14 MMOL/L (ref 6–16)
ANION GAP SERPL CALCULATED.4IONS-SCNC: 15 MMOL/L (ref 6–16)
BUN SERPL-MCNC: 91 MG/DL (ref 8–24)
BUN SERPL-MCNC: 93 MG/DL (ref 8–24)
CALCIUM SERPL-MCNC: 8.8 MG/DL (ref 8.5–10.1)
CALCIUM SERPL-MCNC: 9.3 MG/DL (ref 8.5–10.1)
CHLORIDE SERPL-SCNC: 92 MMOL/L (ref 98–108)
CHLORIDE SERPL-SCNC: 92 MMOL/L (ref 98–108)
CO2 SERPL-SCNC: 20 MMOL/L (ref 21–32)
CO2 SERPL-SCNC: 20 MMOL/L (ref 21–32)
CREAT SERPL-MCNC: 2.36 MG/DL (ref 0.6–1.2)
CREAT SERPL-MCNC: 2.5 MG/DL (ref 0.6–1.2)
DEPRECATED RDW RBC AUTO: 51.3 FL (ref 35.1–46.3)
ERYTHROCYTE [DISTWIDTH] IN BLOOD BY AUTOMATED COUNT: 16.9 % (ref 11.7–14.2)
GLUCOSE SERPL-MCNC: 207 MG/DL (ref 70–99)
GLUCOSE SERPL-MCNC: 217 MG/DL (ref 70–99)
HCT VFR BLD AUTO: 34.2 % (ref 37–53)
HGB BLD-MCNC: 11.6 G/DL (ref 13.5–17.5)
MCHC RBC AUTO-ENTMCNC: 33.9 G/DL (ref 31.5–36.5)
MCV RBC AUTO: 84 FL (ref 80–100)
NRBC # BLD AUTO: 0 K/MM3 (ref 0–0.02)
NRBC BLD AUTO-RTO: 0 /100 WBC (ref 0–0.2)
PLATELET # BLD AUTO: 282 K/MM3 (ref 150–400)
POTASSIUM SERPL-SCNC: 5.5 MMOL/L (ref 3.5–5.5)
POTASSIUM SERPL-SCNC: 6.1 MMOL/L (ref 3.5–5.5)
SODIUM SERPL-SCNC: 126 MMOL/L (ref 136–145)
SODIUM SERPL-SCNC: 127 MMOL/L (ref 136–145)

## 2023-07-17 NOTE — NUR
pt laying in bed eating breakfast, Dr. Flowers in to see him, pt a/ox4, flat
affect, coopertive with care, follows commands well, denies pain, lung sare
clear in upper fields, dim in bases, resp even and unlabored, on 3 liters 02
via n/c, resp even and unlabored, no cough noted, hrr, no edema noted, ppp+2,
cap refill< 3sec, vs stable, afebrile, iv sites are clear and patent, abd
round firm, denies tenderness with palpation, briefs in place for incont, skin
c/w/d, priyank ge, call light in reach.

## 2023-07-17 NOTE — NUR
Pt has been discharged to home, went over instructions with his caregiver,
faxed mew meds to [facility], iv x2 removed intact, left via wheelchair with cna and
caregiver in attendence.

## 2023-07-17 NOTE — NUR
PT A/O PLEASENT WITH FLAT AFFECT NOT C/O NO DISTRESS PT ENJOYS SPEAKING ABOUT
POPULAR CULTURE, POSSIBLY LEAVING IN AM.
NO CHANGE IN CONDITION PT YELLING OUT FOR NURSE BUT ONLY WANTS TO TALK OR BE
REPOSITIONED. 0600 LAB CALLED [facility] CRITICAL VALUE K 6.1, HOSPITALIST WAS
NOTIFIED AND NEW ORDERS WERE IMPLEMEMTED, PT TOLD WELL HAD NO ADVERSE EVENT.

## 2023-07-17 NOTE — NUR
PT A/O X 2- 3 FLAT AFFECT BUT IS APPROPRIATE. PT NORMAL IS SOB STATES HE ISNT
IN DISTRESS BUT HAVING PAIN IN THE ABD. PT REFUSED PAIN MEDICATION STATED HE
STAYS AWAY FROM THAT STUFF. INTERMINIT CONFUSION CALLING OUT TO ME EASILY
REORIENTED SEEMS PT DOSENT WANT TO BE ALONE. ABD IS DISTENDED PT C/O HEAT TO
STOMACH DOSENT WANT TO DO ANYTHING ABOUT IT.